# Patient Record
Sex: MALE | Race: WHITE | NOT HISPANIC OR LATINO | Employment: OTHER | URBAN - METROPOLITAN AREA
[De-identification: names, ages, dates, MRNs, and addresses within clinical notes are randomized per-mention and may not be internally consistent; named-entity substitution may affect disease eponyms.]

---

## 2023-02-03 PROBLEM — C18.0 ADENOCARCINOMA OF CECUM (MULTI): Status: ACTIVE | Noted: 2023-02-03

## 2023-02-03 PROBLEM — D69.6: Status: ACTIVE | Noted: 2023-02-03

## 2023-02-03 PROBLEM — M19.91 PRIMARY OSTEOARTHRITIS: Status: ACTIVE | Noted: 2023-02-03

## 2023-02-03 PROBLEM — I77.89 ENLARGED THORACIC AORTA (CMS-HCC): Status: ACTIVE | Noted: 2023-02-03

## 2023-02-03 PROBLEM — M51.37 DEGENERATIVE DISC DISEASE AT L5-S1 LEVEL: Status: ACTIVE | Noted: 2023-02-03

## 2023-02-03 PROBLEM — C18.9 COLON CANCER (MULTI): Status: ACTIVE | Noted: 2023-02-03

## 2023-02-03 PROBLEM — H81.01 COCHLEAR HYDROPS, RIGHT: Status: ACTIVE | Noted: 2023-02-03

## 2023-02-03 PROBLEM — M25.462 EFFUSION OF LEFT KNEE: Status: ACTIVE | Noted: 2023-02-03

## 2023-02-03 PROBLEM — I25.10 ARTERIOSCLEROSIS OF CORONARY ARTERY: Status: ACTIVE | Noted: 2023-02-03

## 2023-02-03 PROBLEM — E78.00 HYPERCHOLESTEREMIA: Status: ACTIVE | Noted: 2023-02-03

## 2023-02-03 PROBLEM — M54.9 BACK PAIN: Status: ACTIVE | Noted: 2023-02-03

## 2023-02-03 PROBLEM — M62.9 HAMSTRING TIGHTNESS OF BOTH LOWER EXTREMITIES: Status: ACTIVE | Noted: 2023-02-03

## 2023-02-03 PROBLEM — M53.3 SACRAL BACK PAIN: Status: ACTIVE | Noted: 2023-02-03

## 2023-02-03 PROBLEM — M62.89 PSOAS SYNDROME: Status: ACTIVE | Noted: 2023-02-03

## 2023-02-03 PROBLEM — G44.229 CHRONIC TENSION HEADACHES: Status: ACTIVE | Noted: 2023-02-03

## 2023-02-03 PROBLEM — I77.810 MILD ASCENDING AORTA DILATION (CMS-HCC): Status: ACTIVE | Noted: 2023-02-03

## 2023-02-03 PROBLEM — M51.379 DEGENERATIVE DISC DISEASE AT L5-S1 LEVEL: Status: ACTIVE | Noted: 2023-02-03

## 2023-02-03 PROBLEM — H91.93 BILATERAL HEARING LOSS: Status: ACTIVE | Noted: 2023-02-03

## 2023-02-03 PROBLEM — M25.569 KNEE PAIN: Status: ACTIVE | Noted: 2023-02-03

## 2023-02-03 PROBLEM — R93.1 AGATSTON CAC SCORE, >400: Status: ACTIVE | Noted: 2023-02-03

## 2023-02-03 PROBLEM — R73.9 HYPERGLYCEMIA: Status: ACTIVE | Noted: 2023-02-03

## 2023-02-03 PROBLEM — M99.09 SEGMENTAL AND SOMATIC DYSFUNCTION: Status: ACTIVE | Noted: 2023-02-03

## 2023-02-03 PROBLEM — I10 HYPERTENSION: Status: ACTIVE | Noted: 2023-02-03

## 2023-02-03 PROBLEM — H90.3 SENSORINEURAL HEARING LOSS (SNHL) OF BOTH EARS: Status: ACTIVE | Noted: 2023-02-03

## 2023-02-03 RX ORDER — MV-MIN/FOLIC/K1/LYCOPEN/LUTEIN 300-60 MCG
1 TABLET ORAL DAILY
COMMUNITY
Start: 2013-12-18

## 2023-02-03 RX ORDER — AMLODIPINE BESYLATE 10 MG/1
1 TABLET ORAL DAILY
COMMUNITY
Start: 2021-01-07 | End: 2024-05-01 | Stop reason: SDUPTHER

## 2023-02-03 RX ORDER — PREDNISONE 10 MG/1
40 TABLET ORAL DAILY
COMMUNITY
End: 2023-03-17 | Stop reason: ALTCHOICE

## 2023-02-03 RX ORDER — BUTALBITAL, ACETAMINOPHEN AND CAFFEINE 50; 325; 40 MG/1; MG/1; MG/1
1 TABLET ORAL EVERY 8 HOURS
COMMUNITY
Start: 2019-03-19

## 2023-02-03 RX ORDER — LISINOPRIL 40 MG/1
1 TABLET ORAL DAILY
COMMUNITY
Start: 2020-09-03 | End: 2023-08-31 | Stop reason: SDUPTHER

## 2023-02-03 RX ORDER — HYDROGEN PEROXIDE 3 %
20 SOLUTION, NON-ORAL MISCELLANEOUS DAILY
COMMUNITY
End: 2023-03-17 | Stop reason: ALTCHOICE

## 2023-02-03 RX ORDER — ASPIRIN 81 MG/1
1 TABLET ORAL DAILY
COMMUNITY
Start: 2015-01-21

## 2023-02-03 RX ORDER — ATORVASTATIN CALCIUM 20 MG/1
1 TABLET, FILM COATED ORAL NIGHTLY
COMMUNITY
Start: 2019-09-26

## 2023-02-03 RX ORDER — NICOTINE POLACRILEX 2 MG
1 GUM BUCCAL DAILY
COMMUNITY

## 2023-03-09 DIAGNOSIS — E78.00 HYPERCHOLESTEREMIA: ICD-10-CM

## 2023-03-09 DIAGNOSIS — N40.0 BENIGN PROSTATIC HYPERPLASIA WITHOUT LOWER URINARY TRACT SYMPTOMS: ICD-10-CM

## 2023-03-09 DIAGNOSIS — I10 PRIMARY HYPERTENSION: Primary | ICD-10-CM

## 2023-03-09 DIAGNOSIS — R73.9 HYPERGLYCEMIA: ICD-10-CM

## 2023-03-10 ENCOUNTER — LAB (OUTPATIENT)
Dept: LAB | Facility: LAB | Age: 78
End: 2023-03-10
Payer: MEDICARE

## 2023-03-10 DIAGNOSIS — N40.0 BENIGN PROSTATIC HYPERPLASIA WITHOUT LOWER URINARY TRACT SYMPTOMS: ICD-10-CM

## 2023-03-10 DIAGNOSIS — R73.9 HYPERGLYCEMIA: ICD-10-CM

## 2023-03-10 DIAGNOSIS — I10 PRIMARY HYPERTENSION: ICD-10-CM

## 2023-03-10 LAB
ALANINE AMINOTRANSFERASE (SGPT) (U/L) IN SER/PLAS: 21 U/L (ref 10–52)
ALBUMIN (G/DL) IN SER/PLAS: 4.2 G/DL (ref 3.4–5)
ALKALINE PHOSPHATASE (U/L) IN SER/PLAS: 66 U/L (ref 33–136)
ANION GAP IN SER/PLAS: 11 MMOL/L (ref 10–20)
ASPARTATE AMINOTRANSFERASE (SGOT) (U/L) IN SER/PLAS: 28 U/L (ref 9–39)
BASOPHILS (10*3/UL) IN BLOOD BY AUTOMATED COUNT: 0.02 X10E9/L (ref 0–0.1)
BASOPHILS/100 LEUKOCYTES IN BLOOD BY AUTOMATED COUNT: 0.4 % (ref 0–2)
BILIRUBIN TOTAL (MG/DL) IN SER/PLAS: 0.8 MG/DL (ref 0–1.2)
CALCIUM (MG/DL) IN SER/PLAS: 9.2 MG/DL (ref 8.6–10.3)
CARBON DIOXIDE, TOTAL (MMOL/L) IN SER/PLAS: 27 MMOL/L (ref 21–32)
CHLORIDE (MMOL/L) IN SER/PLAS: 99 MMOL/L (ref 98–107)
CHOLESTEROL (MG/DL) IN SER/PLAS: 150 MG/DL (ref 0–199)
CHOLESTEROL IN HDL (MG/DL) IN SER/PLAS: 77.3 MG/DL
CHOLESTEROL/HDL RATIO: 1.9
CREATININE (MG/DL) IN SER/PLAS: 0.94 MG/DL (ref 0.5–1.3)
EOSINOPHILS (10*3/UL) IN BLOOD BY AUTOMATED COUNT: 0.16 X10E9/L (ref 0–0.4)
EOSINOPHILS/100 LEUKOCYTES IN BLOOD BY AUTOMATED COUNT: 3.2 % (ref 0–6)
ERYTHROCYTE DISTRIBUTION WIDTH (RATIO) BY AUTOMATED COUNT: 11.5 % (ref 11.5–14.5)
ERYTHROCYTE MEAN CORPUSCULAR HEMOGLOBIN CONCENTRATION (G/DL) BY AUTOMATED: 32.3 G/DL (ref 32–36)
ERYTHROCYTE MEAN CORPUSCULAR VOLUME (FL) BY AUTOMATED COUNT: 102 FL (ref 80–100)
ERYTHROCYTES (10*6/UL) IN BLOOD BY AUTOMATED COUNT: 4.58 X10E12/L (ref 4.5–5.9)
ESTIMATED AVERAGE GLUCOSE FOR HBA1C: 94 MG/DL
GFR MALE: 83 ML/MIN/1.73M2
GLUCOSE (MG/DL) IN SER/PLAS: 111 MG/DL (ref 74–99)
HEMATOCRIT (%) IN BLOOD BY AUTOMATED COUNT: 46.5 % (ref 41–52)
HEMOGLOBIN (G/DL) IN BLOOD: 15 G/DL (ref 13.5–17.5)
HEMOGLOBIN A1C/HEMOGLOBIN TOTAL IN BLOOD: 4.9 %
IMMATURE GRANULOCYTES/100 LEUKOCYTES IN BLOOD BY AUTOMATED COUNT: 0.4 % (ref 0–0.9)
LDL: 63 MG/DL (ref 0–99)
LEUKOCYTES (10*3/UL) IN BLOOD BY AUTOMATED COUNT: 5 X10E9/L (ref 4.4–11.3)
LYMPHOCYTES (10*3/UL) IN BLOOD BY AUTOMATED COUNT: 2.42 X10E9/L (ref 0.8–3)
LYMPHOCYTES/100 LEUKOCYTES IN BLOOD BY AUTOMATED COUNT: 48.4 % (ref 13–44)
MONOCYTES (10*3/UL) IN BLOOD BY AUTOMATED COUNT: 0.48 X10E9/L (ref 0.05–0.8)
MONOCYTES/100 LEUKOCYTES IN BLOOD BY AUTOMATED COUNT: 9.6 % (ref 2–10)
NEUTROPHILS (10*3/UL) IN BLOOD BY AUTOMATED COUNT: 1.9 X10E9/L (ref 1.6–5.5)
NEUTROPHILS/100 LEUKOCYTES IN BLOOD BY AUTOMATED COUNT: 38 % (ref 40–80)
PLATELETS (10*3/UL) IN BLOOD AUTOMATED COUNT: 273 X10E9/L (ref 150–450)
POTASSIUM (MMOL/L) IN SER/PLAS: 4.8 MMOL/L (ref 3.5–5.3)
PROSTATE SPECIFIC AG (NG/ML) IN SER/PLAS: 0.21 NG/ML (ref 0–4)
PROTEIN TOTAL: 6.8 G/DL (ref 6.4–8.2)
SODIUM (MMOL/L) IN SER/PLAS: 132 MMOL/L (ref 136–145)
TRIGLYCERIDE (MG/DL) IN SER/PLAS: 49 MG/DL (ref 0–149)
UREA NITROGEN (MG/DL) IN SER/PLAS: 13 MG/DL (ref 6–23)
VLDL: 10 MG/DL (ref 0–40)

## 2023-03-10 PROCEDURE — 84153 ASSAY OF PSA TOTAL: CPT

## 2023-03-10 PROCEDURE — 80061 LIPID PANEL: CPT

## 2023-03-10 PROCEDURE — 85025 COMPLETE CBC W/AUTO DIFF WBC: CPT

## 2023-03-10 PROCEDURE — 36415 COLL VENOUS BLD VENIPUNCTURE: CPT

## 2023-03-10 PROCEDURE — 83036 HEMOGLOBIN GLYCOSYLATED A1C: CPT

## 2023-03-10 PROCEDURE — 80053 COMPREHEN METABOLIC PANEL: CPT

## 2023-03-17 ENCOUNTER — OFFICE VISIT (OUTPATIENT)
Dept: PRIMARY CARE | Facility: CLINIC | Age: 78
End: 2023-03-17
Payer: MEDICARE

## 2023-03-17 VITALS
HEIGHT: 69 IN | WEIGHT: 172 LBS | OXYGEN SATURATION: 97 % | TEMPERATURE: 98.1 F | BODY MASS INDEX: 25.48 KG/M2 | SYSTOLIC BLOOD PRESSURE: 138 MMHG | HEART RATE: 71 BPM | RESPIRATION RATE: 16 BRPM | DIASTOLIC BLOOD PRESSURE: 70 MMHG

## 2023-03-17 DIAGNOSIS — I10 PRIMARY HYPERTENSION: ICD-10-CM

## 2023-03-17 DIAGNOSIS — I77.810 MILD ASCENDING AORTA DILATION (CMS-HCC): ICD-10-CM

## 2023-03-17 DIAGNOSIS — G44.221 CHRONIC TENSION-TYPE HEADACHE, INTRACTABLE: Primary | ICD-10-CM

## 2023-03-17 DIAGNOSIS — C18.0 ADENOCARCINOMA OF CECUM (MULTI): ICD-10-CM

## 2023-03-17 DIAGNOSIS — M17.12 PRIMARY OSTEOARTHRITIS OF LEFT KNEE: ICD-10-CM

## 2023-03-17 PROBLEM — C18.9 COLON CANCER (MULTI): Status: RESOLVED | Noted: 2023-02-03 | Resolved: 2023-03-17

## 2023-03-17 PROBLEM — D69.6: Status: RESOLVED | Noted: 2023-02-03 | Resolved: 2023-03-17

## 2023-03-17 PROCEDURE — 1160F RVW MEDS BY RX/DR IN RCRD: CPT | Performed by: INTERNAL MEDICINE

## 2023-03-17 PROCEDURE — 1036F TOBACCO NON-USER: CPT | Performed by: INTERNAL MEDICINE

## 2023-03-17 PROCEDURE — G0439 PPPS, SUBSEQ VISIT: HCPCS | Performed by: INTERNAL MEDICINE

## 2023-03-17 PROCEDURE — 3075F SYST BP GE 130 - 139MM HG: CPT | Performed by: INTERNAL MEDICINE

## 2023-03-17 PROCEDURE — 1159F MED LIST DOCD IN RCRD: CPT | Performed by: INTERNAL MEDICINE

## 2023-03-17 PROCEDURE — 3078F DIAST BP <80 MM HG: CPT | Performed by: INTERNAL MEDICINE

## 2023-03-17 PROCEDURE — 1170F FXNL STATUS ASSESSED: CPT | Performed by: INTERNAL MEDICINE

## 2023-03-17 ASSESSMENT — ENCOUNTER SYMPTOMS
FREQUENCY: 0
SLEEP DISTURBANCE: 0
JOINT SWELLING: 0

## 2023-03-17 ASSESSMENT — ACTIVITIES OF DAILY LIVING (ADL)
GROCERY_SHOPPING: INDEPENDENT
DOING_HOUSEWORK: INDEPENDENT
DRESSING: INDEPENDENT
BATHING: INDEPENDENT
TAKING_MEDICATION: INDEPENDENT
MANAGING_FINANCES: INDEPENDENT

## 2023-03-17 ASSESSMENT — PATIENT HEALTH QUESTIONNAIRE - PHQ9
2. FEELING DOWN, DEPRESSED OR HOPELESS: NOT AT ALL
SUM OF ALL RESPONSES TO PHQ9 QUESTIONS 1 AND 2: 0
1. LITTLE INTEREST OR PLEASURE IN DOING THINGS: NOT AT ALL

## 2023-03-17 NOTE — PROGRESS NOTES
Patient here for a Medicare wellness visit and follow up    Subjective   Patient ID: Jose Gamez is a 77 y.o. male who presents for Follow-up and Medicare Annual Wellness Visit Subsequent.    The patient reports that the back stiffness has significantly improved since completing physical therapy sessions and establishing care with  from Orthopedic Surgery.  He was treated for left knee pain and swelling with aspiration and corticosteroid injection which has helped significantly.  He remains relatively physically active with regular exercise.      The patient continues to follow with  from Cardiology and reports that his condition is stable.      The patient completed his colonoscopy in 12/9/2022 with  from Gastroenterology.  He is no longer taking esomeprazole 20mg every day.    The patient wears a hearing aid device which are somewhat helpful.  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  He reports somewhat good sleep.  The patient drinks plenty of water and denies significant urinary symptoms.          Review of Systems   HENT:  Positive for hearing loss.    Genitourinary:  Negative for frequency.   Musculoskeletal:  Negative for gait problem and joint swelling.   Psychiatric/Behavioral:  Negative for sleep disturbance.        Objective   Physical Exam  Constitutional:       Appearance: Normal appearance.   Cardiovascular:      Rate and Rhythm: Normal rate and regular rhythm.      Heart sounds: Normal heart sounds.   Pulmonary:      Effort: Pulmonary effort is normal.      Breath sounds: Normal breath sounds.   Abdominal:      General: Bowel sounds are normal.      Palpations: Abdomen is soft.      Tenderness: There is no abdominal tenderness.   Skin:     General: Skin is warm and dry.   Neurological:      General: No focal deficit present.      Mental Status: He is alert and oriented to person, place, and time. Mental status is at  baseline.   Psychiatric:         Mood and Affect: Mood normal.         Behavior: Behavior normal.         Assessment/Plan       Medicare Wellness Examination Done  -  Discussed healthy diet and regular exercise.    -  Physical exam overall unremarkable. Immunizations reviewed and updated accordingly. Healthy lifestyle choices discussed (tobacco avoidance, appropriate alcohol use, avoidance of illicit substances).   -  Patient is wearing seatbelt.   -  Screening lab work ordered as indicated.    -  Age appropriate screening tests reviewed with patient.       IMPRESSION/PLAN:     HTN   - /70 today in office, continue on lisinopril 40mg QD and amlodipine 10mg QD.      HLD   - Stable, continue on atorvastatin 20mg QD.     CAD   - Following with Dr. Contreras in Cardiology, takes ASA 81mg QD. Echocardiogram performed 8/2021 showed impaired relaxation patter of left ventricular diastolic filling and left atrium with mild-moderate dilation. Repeat in 8/2022 stable.     Elevated Glucose   - Last a1c 4.9% - 3/2023. Glucose elevated at 111 per 3/2023 CMP, Discussed portion control. Advised foods high in healthy protein (chicken, turkey, salmon) and avoidance of sodium and complex carbs. Encouraged cardio exercise 30 minutes daily.      Tension Headaches   - Takes Fioricet -40mg as needed for onset of headache.     Left Knee Pain/Swelling  - Follows with  from Orthopedic Surgery.    Back stiffness  - Follows up with  from [facility].     History of colon cancer  - has appointment set up w/ Dr. Thayer     Health Maintenance   - Routine labs 3/2023. Last PSA wnl 3/2023.  Last colonoscopy 12/2022, repeat due 12/2025 due to poor prep.  Advised patient to receive his TDAP booster through the Pharmacy and discussed adverse effects.     Follow-up in 6 months, call sooner if needed.       Scribe Attestation  By signing my name below, I, Renetta Pettit , Scribe   attest that this documentation has been prepared  under the direction and in the presence of Perry Samaniego DO.

## 2023-08-31 DIAGNOSIS — I10 PRIMARY HYPERTENSION: Primary | ICD-10-CM

## 2023-08-31 RX ORDER — LISINOPRIL 40 MG/1
40 TABLET ORAL DAILY
Qty: 90 TABLET | Refills: 3 | Status: SHIPPED | OUTPATIENT
Start: 2023-08-31

## 2023-09-18 ENCOUNTER — OFFICE VISIT (OUTPATIENT)
Dept: PRIMARY CARE | Facility: CLINIC | Age: 78
End: 2023-09-18
Payer: MEDICARE

## 2023-09-18 VITALS
WEIGHT: 173 LBS | OXYGEN SATURATION: 98 % | HEART RATE: 79 BPM | SYSTOLIC BLOOD PRESSURE: 128 MMHG | TEMPERATURE: 97.9 F | DIASTOLIC BLOOD PRESSURE: 70 MMHG | BODY MASS INDEX: 25.55 KG/M2 | RESPIRATION RATE: 16 BRPM

## 2023-09-18 DIAGNOSIS — R73.9 HYPERGLYCEMIA: ICD-10-CM

## 2023-09-18 DIAGNOSIS — I10 PRIMARY HYPERTENSION: ICD-10-CM

## 2023-09-18 DIAGNOSIS — Z23 ENCOUNTER FOR IMMUNIZATION: Primary | ICD-10-CM

## 2023-09-18 DIAGNOSIS — I25.10 ARTERIOSCLEROSIS OF CORONARY ARTERY: ICD-10-CM

## 2023-09-18 DIAGNOSIS — Z12.5 PROSTATE CANCER SCREENING: ICD-10-CM

## 2023-09-18 DIAGNOSIS — H91.93 BILATERAL HEARING LOSS, UNSPECIFIED HEARING LOSS TYPE: ICD-10-CM

## 2023-09-18 PROCEDURE — G0008 ADMIN INFLUENZA VIRUS VAC: HCPCS | Performed by: INTERNAL MEDICINE

## 2023-09-18 PROCEDURE — 3074F SYST BP LT 130 MM HG: CPT | Performed by: INTERNAL MEDICINE

## 2023-09-18 PROCEDURE — 1160F RVW MEDS BY RX/DR IN RCRD: CPT | Performed by: INTERNAL MEDICINE

## 2023-09-18 PROCEDURE — 1126F AMNT PAIN NOTED NONE PRSNT: CPT | Performed by: INTERNAL MEDICINE

## 2023-09-18 PROCEDURE — 3078F DIAST BP <80 MM HG: CPT | Performed by: INTERNAL MEDICINE

## 2023-09-18 PROCEDURE — 1159F MED LIST DOCD IN RCRD: CPT | Performed by: INTERNAL MEDICINE

## 2023-09-18 PROCEDURE — 1036F TOBACCO NON-USER: CPT | Performed by: INTERNAL MEDICINE

## 2023-09-18 PROCEDURE — 99214 OFFICE O/P EST MOD 30 MIN: CPT | Performed by: INTERNAL MEDICINE

## 2023-09-18 PROCEDURE — 90662 IIV NO PRSV INCREASED AG IM: CPT | Performed by: INTERNAL MEDICINE

## 2023-09-18 ASSESSMENT — ENCOUNTER SYMPTOMS
DIARRHEA: 0
CONSTIPATION: 0
FREQUENCY: 0
BLOOD IN STOOL: 0

## 2023-09-18 NOTE — PROGRESS NOTES
Patient here for a follow up     Subjective   Patient ID: Jose Gamez is a 78 y.o. male who presents for Follow-up.  He is generally doing well today.    The patient reports knee pain and recalls undergoing paracentesis in 2022.  The symptoms are tolerable to date, and he follows with  from Orthopedic Surgery.    The patient followed up with  from Cardiology, and reports that his condition is stable.  He maintains an active lifestyle, and denies any chest pain or lower limb edema.    The patient reports ongoing hearing impairment and notes that his hearing aid device is partially effective.  He intends to undergo Audiometry a second time.    The patient denies any hematochezia, melena, bowel problems, or significant urinary symptoms.      Review of Systems   HENT:  Positive for hearing loss.    Cardiovascular:  Negative for chest pain and leg swelling.   Gastrointestinal:  Negative for blood in stool, constipation and diarrhea.   Genitourinary:  Negative for frequency.   Musculoskeletal:         Positive for knee pain.       Objective   Physical Exam  Constitutional:       Appearance: Normal appearance.   Neck:      Vascular: No carotid bruit.   Cardiovascular:      Rate and Rhythm: Normal rate and regular rhythm.      Heart sounds: Normal heart sounds.   Pulmonary:      Effort: Pulmonary effort is normal.      Breath sounds: Normal breath sounds.   Abdominal:      General: Bowel sounds are normal.      Palpations: Abdomen is soft.      Tenderness: There is no abdominal tenderness.   Skin:     General: Skin is warm and dry.   Neurological:      General: No focal deficit present.      Mental Status: He is alert and oriented to person, place, and time. Mental status is at baseline.   Psychiatric:         Mood and Affect: Mood normal.         Behavior: Behavior normal.       Assessment/Plan       IMPRESSION/PLAN:     HTN   - /70 today in office, continue on lisinopril 40mg QD and  amlodipine 10mg QD.      HLD   - Stable, continue on atorvastatin 20mg QD.     CAD   - Following with Dr. Contreras in Cardiology, takes ASA 81mg QD. Echocardiogram performed 8/2021 showed impaired relaxation patter of left ventricular diastolic filling and left atrium with mild-moderate dilation. Repeat in 8/2022 stable.     Elevated Glucose   - Last a1c 4.9% - 3/2023. Glucose elevated at 111 per 3/2023 CMP, Discussed portion control. Advised foods high in healthy protein (chicken, turkey, salmon) and avoidance of sodium and complex carbs. Encouraged cardio exercise 30 minutes daily.      Tension Headaches   - Takes Fioricet -40mg as needed for onset of headache.     Left Knee Pain/Swelling  - Follows with  from Orthopedic Surgery.     Back stiffness  - Follows up with  from [facility].     History of colon cancer  - has appointment set up w/ Dr. Thayer     [facility]   - Routine labs 3/2023, will repeat prior to next visit. Last PSA wnl 3/2023.  Last colonoscopy 12/2022, repeat due 12/2025 due to poor prep.  Advised patient to receive the new Covid-19 booster when it is available as well as the RSV vaccine separately through the pharmacy after 1 month, and discussed adverse effects.     Follow-up in 6 months, call sooner if needed.       Scribe Attestation  By signing my name below, IRenetta, Scrcris   attest that this documentation has been prepared under the direction and in the presence of Perry Samaniego DO.

## 2023-11-13 ENCOUNTER — CLINICAL SUPPORT (OUTPATIENT)
Dept: AUDIOLOGY | Facility: CLINIC | Age: 78
End: 2023-11-13
Payer: MEDICARE

## 2023-11-13 DIAGNOSIS — H90.3 SENSORINEURAL HEARING LOSS (SNHL) OF BOTH EARS: Primary | ICD-10-CM

## 2023-11-13 PROCEDURE — 92557 COMPREHENSIVE HEARING TEST: CPT | Performed by: AUDIOLOGIST

## 2023-11-13 PROCEDURE — 92550 TYMPANOMETRY & REFLEX THRESH: CPT | Performed by: AUDIOLOGIST

## 2023-11-13 NOTE — PROGRESS NOTES
Name: Jose Gamez  YOB: 1945  Age: 78 y.o.    Date of Evaluation:  11/13/2023   Jose, 78 years old, was seen for an annual hearing test and hearing aid check. He sees Dr. Sadie Argueta MD. for decreased hearing and a clogged feeling in the right ear. He denied dizziness. Most recent hearing test from January 6, 2023 indicated a a mild rising to within normal limits at 1000 Hz sloping to severe sensorineural hearing loss in the right ear and hearing sensitivity within normal limits through 2000 Hz sloping to a moderately-severe sensorineural hearing loss in the left ear. An asymmetry of 10 - 25 dB HL was noted from 500 - 3000 Hz and at 8000 Hz (right poorer than left). He wears binaural Phonak AGATA hearing aids. He reports his right ear to be worse compared to his last hearing test.    Phonak Audeo P50-R  Right: [id number]  Left: [id number]    Warranty expires 9/14/2025     Evaluation:  Otoscopy revealed clear canals with visible tympanic membranes bilaterally.    Immittance:  Right: Type A middle ear function with normal compliance, peak pressure, and ear canal volume.  Left: Type A middle ear function with normal compliance, peak pressure, and ear canal volume.    Ipsilateral Acoustic Reflexes:  Right: Present 500-2000 Hz. Absent 4000 Hz.  Left: Present at 0488-5679 Hz. Absent 500 and 4000 Hz.    Behavioral Audiometry:  Right: moderate rising to mild sloping to moderate sensorineural hearing loss 125-8000 Hz. Fair to poor word understanding (40 %) at 70 dB HL.  Left:  normal hearing sloping to moderate sensorineural hearing loss 125-8000 Hz. Excellent word understanding (96 %) at 60 dB HL.    Pure tone averages in agreement with speech reception thresholds.    Results:  Today's results were discussed with the patient indicating a decrease in hearing in his right ear. Hearing aids were reprogrammed to today's results.    Mr. Gamez would like a second opinion regarding his right  cochlear hydrops diagnosis. He was given the  Otology scheduling line to schedule when interested.    Treatment Plan:  Follow-up with rENT  Retest hearing in conjunction with medical management or if a change in hearing occurs  Daily use binaural amplification    Time: 2962-8806    DAVID Moyer, CCC-A  Licensed Audiologist

## 2023-11-30 ENCOUNTER — TELEMEDICINE (OUTPATIENT)
Dept: PRIMARY CARE | Facility: CLINIC | Age: 78
End: 2023-11-30
Payer: MEDICARE

## 2023-11-30 DIAGNOSIS — U07.1 COVID-19: Primary | ICD-10-CM

## 2023-11-30 PROCEDURE — 99213 OFFICE O/P EST LOW 20 MIN: CPT | Performed by: INTERNAL MEDICINE

## 2023-11-30 RX ORDER — AZITHROMYCIN 250 MG/1
TABLET, FILM COATED ORAL DAILY
COMMUNITY
End: 2024-02-13 | Stop reason: ALTCHOICE

## 2023-11-30 ASSESSMENT — ENCOUNTER SYMPTOMS
EYE REDNESS: 1
COUGH: 1

## 2023-11-30 NOTE — PROGRESS NOTES
Patient doing a virtual visit for positive COVID.  Virtual or Telephone Consent    An interactive audio and video telecommunication system which permits real time communications between the patient (at the originating site) and provider (at the distant site) was utilized to provide this telehealth service.   Verbal consent was requested and obtained from Jose Gamez on this date, 11/30/23 for a telehealth visit.       Subjective   Patient ID: Jose Gamez is a 78 y.o. male who presents for URI.    The patient reports right-sided eye redness, bilateral ear fullness, chest congestion, productive cough with clear sputum since about 11/24/2023.  He presented to Urgent Care twice since that time, and was started on Zithromax which has been helping along with Delsym cough syrup.  The patient recalls that a Chest Xray was unremarkable.  More recently, he and his wife both tested positive for Covid-19.  Overall, his condition is improving.    URI   Associated symptoms include coughing.     Review of Systems   HENT:          Positive for bilateral ear fullness.   Eyes:  Positive for redness.   Respiratory:  Positive for cough.        Objective   Physical Exam  Physical examination not done.    Assessment/Plan   Problem List Items Addressed This Visit    None      IMPRESSION/PLAN:     Covid-19  - Patient is outside of window for Paxlovid therapy.  Condition is improving.  Continue with Zithromax until finished, and advised to start Mucinex 1200 mg BID over the counter.  Can also try Flonase as two sprays in each nostril for symptom relief, and can add on Afrin for up to 2-3 days as described on the packaging.  Do not go longer than this timeframe with Afrin.  Advised patient to quarantine for first five days from onset of symptoms, and then wear a mask for five days thereafter.  Rest as much as possible, and drink plenty of water to remain well hydrated. Call the clinic if symptoms persist or worsen.      HTN   - Continue on lisinopril 40mg QD and amlodipine 10mg QD.      HLD   - Stable, continue on atorvastatin 20mg QD.     CAD   - Following with Dr. Contreras in Cardiology, takes ASA 81mg QD. Echocardiogram performed 8/2021 showed impaired relaxation patter of left ventricular diastolic filling and left atrium with mild-moderate dilation. Repeat in 8/2022 stable.     Elevated Glucose   - Last a1c 4.9% - 3/2023. Glucose elevated at 111 per 3/2023 CMP, Discussed portion control. Advised foods high in healthy protein (chicken, turkey, salmon) and avoidance of sodium and complex carbs. Encouraged cardio exercise 30 minutes daily.      Tension Headaches   - Takes Fioricet -40mg as needed for onset of headache.     Left Knee Pain/Swelling  - Follows with  from Orthopedic Surgery.     Back stiffness  - Follows up with  from [facility].     History of colon cancer  - has appointment set up w/ Dr. Thayer     [facility]   - Routine labs 3/2023, will repeat prior to next visit. Last PSA wnl 3/2023.  Last colonoscopy 12/2022, repeat due 12/2025 due to poor prep.       Follow-up in 6 months, call sooner if needed.       Scribe Attestation  By signing my name below, I, Renetta Pettit, Darrion   attest that this documentation has been prepared under the direction and in the presence of Perry Samaniego DO.

## 2023-12-13 ENCOUNTER — OFFICE VISIT (OUTPATIENT)
Dept: PRIMARY CARE | Facility: CLINIC | Age: 78
End: 2023-12-13
Payer: MEDICARE

## 2023-12-13 VITALS
DIASTOLIC BLOOD PRESSURE: 64 MMHG | BODY MASS INDEX: 25.42 KG/M2 | HEART RATE: 60 BPM | SYSTOLIC BLOOD PRESSURE: 115 MMHG | HEIGHT: 69 IN | TEMPERATURE: 97.3 F | OXYGEN SATURATION: 98 % | WEIGHT: 171.6 LBS

## 2023-12-13 DIAGNOSIS — H72.92 PERFORATION OF LEFT TYMPANIC MEMBRANE: Primary | ICD-10-CM

## 2023-12-13 PROBLEM — B00.9 HERPESVIRAL INFECTION, UNSPECIFIED: Status: ACTIVE | Noted: 2018-03-23

## 2023-12-13 PROBLEM — H91.23 SUDDEN HEARING LOSS OF BOTH EARS: Status: ACTIVE | Noted: 2023-12-01

## 2023-12-13 PROBLEM — L90.5 SCAR CONDITION AND FIBROSIS OF SKIN: Status: ACTIVE | Noted: 2018-03-23

## 2023-12-13 PROBLEM — C44.311 BASAL CELL CARCINOMA OF SKIN OF NOSE: Status: ACTIVE | Noted: 2018-03-23

## 2023-12-13 PROBLEM — L71.9 ROSACEA, UNSPECIFIED: Status: ACTIVE | Noted: 2018-03-23

## 2023-12-13 PROCEDURE — 1159F MED LIST DOCD IN RCRD: CPT | Performed by: NURSE PRACTITIONER

## 2023-12-13 PROCEDURE — 1036F TOBACCO NON-USER: CPT | Performed by: NURSE PRACTITIONER

## 2023-12-13 PROCEDURE — 3078F DIAST BP <80 MM HG: CPT | Performed by: NURSE PRACTITIONER

## 2023-12-13 PROCEDURE — 1126F AMNT PAIN NOTED NONE PRSNT: CPT | Performed by: NURSE PRACTITIONER

## 2023-12-13 PROCEDURE — 1160F RVW MEDS BY RX/DR IN RCRD: CPT | Performed by: NURSE PRACTITIONER

## 2023-12-13 PROCEDURE — 3074F SYST BP LT 130 MM HG: CPT | Performed by: NURSE PRACTITIONER

## 2023-12-13 PROCEDURE — 99213 OFFICE O/P EST LOW 20 MIN: CPT | Performed by: NURSE PRACTITIONER

## 2023-12-13 ASSESSMENT — PATIENT HEALTH QUESTIONNAIRE - PHQ9
2. FEELING DOWN, DEPRESSED OR HOPELESS: NOT AT ALL
1. LITTLE INTEREST OR PLEASURE IN DOING THINGS: NOT AT ALL
SUM OF ALL RESPONSES TO PHQ9 QUESTIONS 1 AND 2: 0

## 2023-12-13 NOTE — PROGRESS NOTES
"Subjective   Patient ID: Jose Gamez is a 78 y.o. male who presents for blocked ear.    Presents for  follow up for left ear pressure and acute loss of hearing.  Has been having chronic hearing loss for some time; considering cochlear implants. Left ear though is usually his good ear. Now hard to hear overall.  He was given amoxicillin 1,000 mg TID x 5 days and otic drops.  Started these yesterday.  Denies any drainage.  Started after coughing a lot and URI symptoms from COVID end of November.          Review of Systems  otherwise negative aside from what was mentioned above in HPI.    Objective   /64   Pulse 60   Temp 36.3 °C (97.3 °F)   Ht 1.753 m (5' 9\")   Wt 77.8 kg (171 lb 9.6 oz)   SpO2 98%   BMI 25.34 kg/m²     Physical Exam  Constitutional:       Appearance: Normal appearance.   HENT:      Right Ear: Tympanic membrane, ear canal and external ear normal.      Left Ear: Ear canal and external ear normal. A middle ear effusion is present. Tympanic membrane is perforated.      Ears:      Comments: Perforated TM appears to be healing already; Scabbed 11oclock region  Cardiovascular:      Rate and Rhythm: Normal rate and regular rhythm.   Pulmonary:      Effort: Pulmonary effort is normal.      Breath sounds: Normal breath sounds.   Abdominal:      General: Abdomen is flat. Bowel sounds are normal.   Neurological:      General: No focal deficit present.      Mental Status: He is alert and oriented to person, place, and time. Mental status is at baseline.   Psychiatric:         Mood and Affect: Mood normal.         Behavior: Behavior normal.         Thought Content: Thought content normal.         Judgment: Judgment normal.         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 On Amoxicillin 1 g TID x 5 days and otic drops  Follow " up with ENT as scheduled. Sooner in our office if not better.

## 2023-12-13 NOTE — ASSESSMENT & PLAN NOTE
Acute on chronic hearing loss from infection. On Amoxicillin 1 g TID x 5 days and otic drops  Follow up with ENT as scheduled. Sooner in our office if not better.

## 2024-01-18 ENCOUNTER — CLINICAL SUPPORT (OUTPATIENT)
Dept: AUDIOLOGY | Facility: CLINIC | Age: 79
End: 2024-01-18
Payer: MEDICARE

## 2024-01-18 ENCOUNTER — OFFICE VISIT (OUTPATIENT)
Dept: OTOLARYNGOLOGY | Facility: CLINIC | Age: 79
End: 2024-01-18
Payer: MEDICARE

## 2024-01-18 VITALS — HEIGHT: 70 IN | WEIGHT: 173 LBS | TEMPERATURE: 97.7 F | BODY MASS INDEX: 24.77 KG/M2 | RESPIRATION RATE: 16 BRPM

## 2024-01-18 DIAGNOSIS — H93.13 TINNITUS, BILATERAL: ICD-10-CM

## 2024-01-18 DIAGNOSIS — H81.01 COCHLEAR HYDROPS, RIGHT: ICD-10-CM

## 2024-01-18 DIAGNOSIS — H90.3 SENSORINEURAL HEARING LOSS (SNHL) OF BOTH EARS: Primary | ICD-10-CM

## 2024-01-18 DIAGNOSIS — H90.A21 SENSORINEURAL HEARING LOSS (SNHL) OF RIGHT EAR WITH RESTRICTED HEARING OF LEFT EAR: Primary | ICD-10-CM

## 2024-01-18 PROCEDURE — 99213 OFFICE O/P EST LOW 20 MIN: CPT | Performed by: STUDENT IN AN ORGANIZED HEALTH CARE EDUCATION/TRAINING PROGRAM

## 2024-01-18 PROCEDURE — 1036F TOBACCO NON-USER: CPT | Performed by: STUDENT IN AN ORGANIZED HEALTH CARE EDUCATION/TRAINING PROGRAM

## 2024-01-18 PROCEDURE — 92550 TYMPANOMETRY & REFLEX THRESH: CPT | Performed by: AUDIOLOGIST

## 2024-01-18 PROCEDURE — 1126F AMNT PAIN NOTED NONE PRSNT: CPT | Performed by: STUDENT IN AN ORGANIZED HEALTH CARE EDUCATION/TRAINING PROGRAM

## 2024-01-18 PROCEDURE — 1160F RVW MEDS BY RX/DR IN RCRD: CPT | Performed by: STUDENT IN AN ORGANIZED HEALTH CARE EDUCATION/TRAINING PROGRAM

## 2024-01-18 PROCEDURE — 1159F MED LIST DOCD IN RCRD: CPT | Performed by: STUDENT IN AN ORGANIZED HEALTH CARE EDUCATION/TRAINING PROGRAM

## 2024-01-18 PROCEDURE — 92557 COMPREHENSIVE HEARING TEST: CPT | Performed by: AUDIOLOGIST

## 2024-01-18 ASSESSMENT — ENCOUNTER SYMPTOMS
DEPRESSION: 0
OCCASIONAL FEELINGS OF UNSTEADINESS: 0
LOSS OF SENSATION IN FEET: 0

## 2024-01-18 ASSESSMENT — COLUMBIA-SUICIDE SEVERITY RATING SCALE - C-SSRS
2. HAVE YOU ACTUALLY HAD ANY THOUGHTS OF KILLING YOURSELF?: NO
6. HAVE YOU EVER DONE ANYTHING, STARTED TO DO ANYTHING, OR PREPARED TO DO ANYTHING TO END YOUR LIFE?: NO
1. IN THE PAST MONTH, HAVE YOU WISHED YOU WERE DEAD OR WISHED YOU COULD GO TO SLEEP AND NOT WAKE UP?: NO

## 2024-01-18 ASSESSMENT — PAIN SCALES - GENERAL: PAINLEVEL: 0-NO PAIN

## 2024-01-18 NOTE — PROGRESS NOTES
"AUDIOLOGY ADULT AUDIOMETRIC EVALUATION      Name:  Jose Gamez  :  1945  Age:  78 y.o.  Date of Evaluation:  2024    HISTORY  Reason for visit:  hearing loss  Mr. Gamez is seen 2024 at the request of Rajendra Uriarte M.D. for an evaluation of hearing.      Chief complaint:    Hearing issues for years    - he went snorkeling around [place]2023  - he experienced bacterial chest infection and lost left hearing   - there was concern for left tympanic membrane perforation   - hearing has improved but not back to baseline  - he has a diagnosis of right cochlear hydrops    Hearing loss:  sincve 2023, right seems stable, left is reportedly worse  Tinnitus:   no change; not bothersome  Hearing aids: Patient uses bilateral Phonak Audeo P50-R [employer] aids (SN Right: [id number]; Left: [id number])    EVALUATION  Please find audiogram in \"Media\" tab (Document Type:  Audiology Report).    RESULTS   Otoscopic Evaluation: Right, clear canal.  Left, non-occlusive debris.       Immittance Measures (226 Hz probe tone):     Right ear:  Tympanometry is consistent with normal middle ear pressure and normal tympanic membrane mobility.    Left ear: Tympanometry is consistent with normal middle ear pressure and restricted tympanic membrane mobility.   note wide, rounded left trace.     Test technique:  standard behavioral technique via insert earphones.  Reliability is good.    Pure Tone Audiometry:    Right ear:  Hearing sensitivity is in the mild to moderately-severe hearing loss range.    Left ear: Hearing sensitivity is in the normal hearing to moderately-severe hearing loss range.     Hearing loss is sensorineural   Note asymmetry for 125-500 and 9589-7063 Hz (right worse than left)     Speech Audiometry:        Right Ear:  Speech Reception Threshold (SRT) was obtained at 30 dBHL                 Speech discrimination score was 76% in quiet when words were presented at 80 dBHL      Left Ear:  " Speech Reception Threshold (SRT) was obtained at 25 dBHL                 Speech discrimination score was 84% in quiet when words were presented at 75 dBHL    IMPRESSIONS:  In comparison with previous audiogram of 1/6/2023, there has been improvement in right hearing for 125-1000 Hz as well as improvement in right speech discrimination score.  There has been worsening of left hearing for 1511-7069 Hz.      Patient is expected to have communication difficulty in adverse listening environments.    Patient is expected to benefit from devices that provide amplification (e.g., hearing aids) and improve the desired sound signal over that of background noise.      RECOMMENDATIONS  Continue with otologic follow-up with Rajendra Uriarte M.D.   Hearing Aid Evaluation with an audiologist to discuss hearing technology (such as hearing aids) and services.   Reassess hearing in 1 year (or sooner if medically indicated or if there is a concern for a change in hearing).       PATIENT EDUCATION  Discussed results and recommendations with patient.  Questions were addressed and the patient was encouraged to contact our department should concerns arise.       DAVID Hernandez, CCC-A  Licensed Audiologist

## 2024-01-19 NOTE — PROGRESS NOTES
CHIEF COMPLAINT:   Chief Complaint   Patient presents with    New Patient Visit     Hearing loss       HISTORY OF PRESENT ILLNESS: Jose Gamez is a 78 y.o. male who presents today with symptoms of left and right ear hearing loss.  He has a history of right sided fluctuating hearing loss and has been previously seen by Dr. 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  Dr. Argueta obtained an MRI IAC, which did not demonstrate retrocochlear pathology.  He presents today to discuss his left ear.  Around Thanksgiving, he had an incident with diving, and since that time, he felt that his left ear has been muffled and with fullness in the left ear.  He has tinnitus in both ears.  He denies prior ear surgery.       PAST MEDICAL HISTORY:   Past Medical History:   Diagnosis Date    Abnormal electrocardiogram (ECG) (EKG)     Abnormal EKG    Hyperlipidemia, unspecified 11/03/2013    Hyperlipidemia    Malignant neoplasm of colon, unspecified (CMS/HCC) 03/23/2021    Colon cancer    Other abnormal glucose 01/12/2016    Blood glucose abnormal    Other specified postprocedural states     H/O colonoscopy    Personal history of other diseases of male genital organs     History of erectile dysfunction    Personal history of other diseases of the musculoskeletal system and connective tissue     Personal history of arthritis    Personal history of other diseases of the nervous system and sense organs     History of cataract    Personal history of other diseases of the nervous system and sense organs     History of migraine    Personal history of other malignant neoplasm of large intestine     History of malignant neoplasm of colon    Personal history of other malignant neoplasm of skin     History of basal cell carcinoma (BCC)    Polyp of colon     Benign colon polyp       PAST SURGICAL HISTORY:   Past Surgical History:   Procedure Laterality Date    OTHER SURGICAL HISTORY  12/18/2013    Surgery Of The Eyelids  "   OTHER SURGICAL HISTORY  07/30/2019    Excision of basal cell carcinoma    OTHER SURGICAL HISTORY  07/30/2019    Cataract surgery    OTHER SURGICAL HISTORY  07/30/2019    Vasectomy    TONSILLECTOMY  12/18/2013    Tonsillectomy    VASECTOMY  12/18/2013    Surgery Vas Deferens Vasectomy       MEDICATIONS:   Current Outpatient Medications:     amLODIPine (Norvasc) 10 mg tablet, Take 1 tablet (10 mg) by mouth once daily., Disp: , Rfl:     aspirin 81 mg EC tablet, Take 1 tablet (81 mg) by mouth once daily., Disp: , Rfl:     atorvastatin (Lipitor) 20 mg tablet, Take 1 tablet (20 mg) by mouth once daily at bedtime., Disp: , Rfl:     biotin 1 mg capsule, Take 1 capsule (1 mg) by mouth once daily., Disp: , Rfl:     butalbital-acetaminophen-caff -40 mg tablet, Take 1 tablet by mouth every 8 hours., Disp: , Rfl:     lisinopril 40 mg tablet, Take 1 tablet (40 mg) by mouth once daily., Disp: 90 tablet, Rfl: 3    multivit-min-FA-lycopen-lutein (Centrum Silver Ultra Men's) 300-600-300 mcg tablet, Take 1 tablet by mouth 1 (one) time each day., Disp: , Rfl:     azithromycin (Zithromax) 250 mg tablet, Take by mouth once daily., Disp: , Rfl:     ALLERGIES:   Allergies   Allergen Reactions    Animal Dander Unknown    Bee Pollen Unknown    Grass Pollen Unknown       SOCIAL HISTORY:   reports that he has never smoked. He has never used smokeless tobacco. He reports current alcohol use of about 14.0 standard drinks of alcohol per week. He reports that he does not use drugs.    FAMILY HISTORY: family history includes Arthritis in his mother; Asthma in his father; COPD in his mother; Fibromyalgia in his mother.    PHYSICAL EXAM:    VITALS:   Vitals:    01/18/24 0908   Resp: 16   Temp: 36.5 °C (97.7 °F)   TempSrc: Temporal   Weight: 78.5 kg (173 lb)   Height: 1.778 m (5' 10\")        GENERAL: healthy, alert, well developed, well nourished, no distress, cooperative, appears chronologic age    Communicates with normal voice and without " hearing aids.    HEAD: atraumatic, normocephalic, no lesions    EYES: normal, PERRLA and EOM's intact    EARS:   Right ear demonstrates a patent external auditory canal with a normal tympanic membrane.    Left ear: demonstrates a patent external auditory canal with an intact tympanic membrane with myringosclerosis.  He is able to auto insufflate.   Castellanos tuning fork test lateralizes midline 512 Hz.   Rinne testing with a 512 Hz tuning fork: Right Air conduction > Bone conduction   Left Air conduction > Bone conduction      NOSE: nose shows no deformity, asymmetry, or inflammation, nasal mucosa normal.    ORAL CAVITY/OROPHARYNX: negative findings: lips normal without lesions, buccal mucosa normal, gums healthy, teeth intact, non-carious, palate normal, tongue midline and normal, soft palate, uvula, and tonsils normal    NECK AND SALIVARY GLANDS: Neck is supple without masses or lymphadenopathy. Palpation of the parotid and submandibular gland reveals no masses or tenderness to palpation.    CRANIAL NERVES: intact,   Facial nerve exam  is House - Brackmann 1- Normal Function on the right and 1- Normal Function on the left.    CARDIOVASCULAR: radial pulse is palpable and regular, no evidence of peripheral edema    PULMONARY: normal lung excursion, no evidence of retractions    DATA REVIEWED:  Audiogram  I reviewed the audiogram from 1/18/2024, which demonstrated left normal sloping to moderate sensorineural hearing loss and right mild rising to near normal and sloping to moderate sensorineural hearing loss.  WRS was 76% on the right and 84% on the left.  After reviewing multiple previous audiograms, there is significant fluctuation, particularly on the right side, but mostly stable hearing in both the left and right side.     MRI scan  I reviewed both the report and images from the MRI from 1/16/2023, which did not demonstrate retrocochlear pathology    IMPRESSION:   1) Bilateral fluctuating hearing loss, right worse  than left    PLAN:  I discussed and reviewed the patient's audiogram with him and also compared to prior audiograms.  He has had a slow drop in his word recognition score on the left, but overall he is thresholds have hold steady over the last 2 years.  I also discussed that the right ear is actually improved today compared to the audiogram from November.  I discussed that on my exam, I do not appreciate a tympanic membrane perforation or evidence of effusion.  I suspect that he may have had an infection at some point, and I hope that he will improve his sensation on the left ear.  Nevertheless, I do not recommend any surgical intervention for his ear.  Further, because his hearing is relatively stable over time, I do not recommend any steroid injections or oral steroids at this time.  I would like to see him back in 3 months with an audiogram to see where his hearing ends up to make sure that his hearing is stable.    Rajendra Uriarte MD

## 2024-01-29 ENCOUNTER — CLINICAL SUPPORT (OUTPATIENT)
Dept: AUDIOLOGY | Facility: CLINIC | Age: 79
End: 2024-01-29
Payer: MEDICARE

## 2024-01-29 DIAGNOSIS — H90.3 SENSORINEURAL HEARING LOSS (SNHL) OF BOTH EARS: Primary | ICD-10-CM

## 2024-01-29 PROCEDURE — V5011 HEARING AID FITTING/CHECKING: HCPCS | Performed by: AUDIOLOGIST

## 2024-01-29 NOTE — PROGRESS NOTES
Name: Jose Gamez  YOB: 1945  Age: 78 y.o.    Date of Evaluation:  01/29/2024   Jose, 78 years old, was seen for hearing aid programming. He recently had recurrent otitis media, COVID, and a bacterial infection that affected his hearing. He was seen by Dr. Uriarte on 1/18/2023 and a hearing test was completed at that time. Results indicates a mild to moderately-severe sensorineural hearing loss in the right ear and normal hearing sloping to a moderately-severe sensorineural hearing loss in the left ear.       He wears binaural Phonak AGATA hearing aids. He is here to have his hearing aids updated to his most recent audiogram.    Phonak Audeo [id number]  Right: [id number]  Left: [id number]    Warranty expires 9/14/2025     Hearing Aid Check:  Hearing aids were connected to the fitting software and updated to his most recent thresholds. Bandwidth was changed to Fixed Bandwidth due to Bluetooth streaming becoming disconnected and advised through  to change bandwidth.    Aided testing in the sound field indicates aided thresholds in the mild hearing loss range from 250-4000 Hz. Speech Reception Threshold obtained at 35 dB HL. Word understanding using CNC recorded word list at 55 dB HL repeated with 92% correct. Sentence understanding using [employer] recorded word list at 55 dB HL repeated with 99% correct.     Treatment Plan:  Follow-up with ENT as directed  Retest hearing in conjunction with medical management or if a change in hearing occurs  Daily use binaural amplification    Time: 3473-1203    DAVID Moyer, [facility]  Licensed Audiologist

## 2024-02-14 ENCOUNTER — OFFICE VISIT (OUTPATIENT)
Dept: CARDIOLOGY | Facility: CLINIC | Age: 79
End: 2024-02-14
Payer: MEDICARE

## 2024-02-14 VITALS
WEIGHT: 172 LBS | OXYGEN SATURATION: 100 % | HEIGHT: 70 IN | SYSTOLIC BLOOD PRESSURE: 126 MMHG | HEART RATE: 63 BPM | DIASTOLIC BLOOD PRESSURE: 70 MMHG | BODY MASS INDEX: 24.62 KG/M2

## 2024-02-14 DIAGNOSIS — R93.1 AGATSTON CAC SCORE, >400: ICD-10-CM

## 2024-02-14 DIAGNOSIS — I77.810 MILD ASCENDING AORTA DILATION (CMS-HCC): ICD-10-CM

## 2024-02-14 DIAGNOSIS — I25.10 ARTERIOSCLEROSIS OF CORONARY ARTERY: Primary | ICD-10-CM

## 2024-02-14 DIAGNOSIS — I77.89 ENLARGED THORACIC AORTA (CMS-HCC): ICD-10-CM

## 2024-02-14 DIAGNOSIS — I10 PRIMARY HYPERTENSION: ICD-10-CM

## 2024-02-14 PROCEDURE — 1036F TOBACCO NON-USER: CPT | Performed by: INTERNAL MEDICINE

## 2024-02-14 PROCEDURE — 3074F SYST BP LT 130 MM HG: CPT | Performed by: INTERNAL MEDICINE

## 2024-02-14 PROCEDURE — 1157F ADVNC CARE PLAN IN RCRD: CPT | Performed by: INTERNAL MEDICINE

## 2024-02-14 PROCEDURE — 3078F DIAST BP <80 MM HG: CPT | Performed by: INTERNAL MEDICINE

## 2024-02-14 PROCEDURE — 1159F MED LIST DOCD IN RCRD: CPT | Performed by: INTERNAL MEDICINE

## 2024-02-14 PROCEDURE — 99214 OFFICE O/P EST MOD 30 MIN: CPT | Performed by: INTERNAL MEDICINE

## 2024-02-14 PROCEDURE — 1160F RVW MEDS BY RX/DR IN RCRD: CPT | Performed by: INTERNAL MEDICINE

## 2024-02-14 PROCEDURE — 93010 ELECTROCARDIOGRAM REPORT: CPT | Performed by: INTERNAL MEDICINE

## 2024-02-14 PROCEDURE — 1126F AMNT PAIN NOTED NONE PRSNT: CPT | Performed by: INTERNAL MEDICINE

## 2024-02-14 PROCEDURE — 93005 ELECTROCARDIOGRAM TRACING: CPT | Performed by: INTERNAL MEDICINE

## 2024-02-14 ASSESSMENT — PAIN SCALES - GENERAL: PAINLEVEL: 0-NO PAIN

## 2024-02-14 NOTE — PATIENT INSTRUCTIONS
You were seen in the [facility] for your coronary artery disease with high calcium score.     Your September 2019 calcium score was 1054 which implies a high amount of atherosclerosis (hardening of the heart arteries). Based on your current risk factors (former smoking, cholesterol numbers), you had a 16% chance of having a heart attack in the next 10 years before we made changes.     I recommend that we do the following to reduce your heart attack risk:  1. Continue to take aspirin 81 mg a day for life. You can take enteric coated (EC) tablets    2. Continue to take your cholesterol medication, atorvastatin 20 mg a day. This medication has reduced your cholesterol numbers to goal (LDL, the bad cholesterol is down 50% from 132 prior to statin therapy to 63 in March 2023) and can help remove cholesterol plaque from the artery walls). Long term LDL goal < 70.    3. Moderate intensity exercise at least 3 times a week for 30-45 minutes a time. Exercise helps protect the heart and blood vessels. Your treadmill exercise is excellent and protecting your heart.    4. Eat a heart healthy diet. Limit portions of red meat. Eat fresh fruits and vegetables instead of processed carbohydrates. Olive oil (1 tablespoon a day) as a source of omega-3 fat. The Mediterranean diet has been shown in clinical trials to reduce risk of heart disease by following these principles.     Your August 2023 echocardiogram was unchanged from your August 2022 and August 2021 echocardiograms showing a stable aorta size of 4.1 cm. I recommend that we repeat this test to measure the size of your thoracic aorta again in August 2024 for a 1 year follow up study.      Your blood pressure has been at your long term goal range of 120-130 mm Hg with home readings 110s-120s mm Hg. Continue amlodipine 10 mg a day and lisinopril 40 mg a day.      Continue to check your blood pressure and keep a log book as you have been doing. Sit down  and rest for 3-5 minutes and then place your arm on the table to keep it level with your heart for best measurements.     Follow up with Dr. Contreras in 6 months.

## 2024-02-14 NOTE — PROGRESS NOTES
Subjective   Jose Gamez is a 78 y.o. male who presents to the [facility] for follow up of elevated CT calcium score and mild thoracic aorta enlargement. Last seen in August 2023.     Since our last visit, Mr. Gamez is stable without cardiac symptoms of active chest pain, dyspnea on exertion, PND, orthopnea, ERICK, palpitations, syncope, or claudication.     Is active outside and walks 3x/week on new home treadmill for 30 minutes. Has some progressive leg / back stiffness that cause mild shortness of breath correlating to increased oxygen cost of movement.     Ambulatory blood pressure recordings averaging 120s/70s mm Hg on patient home BP log book. Home record scanned into AE at visit today.     He had a 9/10/2019 CT calcium score that was very elevated at 1054. His 10 year FLORES risk score was 16% (intermediate-high risk) for MI prior to risk factor modification.      Past Medical History:  1. Coronary arteriosclerosis: 9/10/2019 CACS 1054 (LMCA 281, , LCx 0, ). FLORES 10 yr risk score 16%  2. Dyslipidemia: 3/7/2019 (no tx): ; TG 59; HDL 76; ; 1/6/2020 (atorva 20 mg): ; TG 47; HDL 79; LDL 67  3. Mild ascending aorta enlargement (4.1 cm 9/2019 CT, 2014 CT 4 cm). 8/11/2021 echo Asc Ao 4.1 cm; 8/4/2022 echo Asc Ao 4.1 cm.  4. h/o colon cancer     Social History:  Former smoker (quit in 1976, ~ 15 pack year history)     Family History:  No premature CAD in 1st degree relatives ( 55 years of age for male relatives, 65 years of age for female relatives)     Review of Systems    A 14 point review of systems was asked. All questions were negative except for pertinent positives listed in the HPI.      Objective   Physical Exam  BP Readings from Last 3 Encounters:   02/14/24 126/70   12/13/23 115/64   09/18/23 128/70      Wt Readings from Last 3 Encounters:   02/14/24 78 kg (172 lb)   01/18/24 78.5 kg (173 lb)   12/13/23 77.8 kg (171 lb 9.6 oz)     "  BMI: Estimated body mass index is 24.68 kg/m² as calculated from the following:    Height as of this encounter: 1.778 m (5' 10\").    Weight as of this encounter: 78 kg (172 lb).  BSA: Estimated body surface area is 1.96 meters squared as calculated from the following:    Height as of this encounter: 1.778 m (5' 10\").    Weight as of this encounter: 78 kg (172 lb).    General: no acute distress  HEENT: EOMI, no scleral icterus.  Lungs: Clear to auscultation bilaterally without wheezing, rales, or rhonchi.  Cardiovascular: Regular rhythm and rate. Normal S1 and S2. No murmurs, rubs, or gallops are appreciated. JVP normal.  Abdomen: Soft, nontender, nondistended. Bowel sounds present.  Extremities: Warm and well perfused with equal 2+ pulses bilaterally.  No edema present.  Neurologic: Alert and oriented x3.    I have personally reviewed the following images and laboratory findings:  Last echocardiogram:  Most recent echo, 2023: LVEF 55-60%, LV conc remodeling (LVMI 105 gm/m2), impaired relaxation diastology (E/e' 7), normal LA size (NANETTE 28 ml/m2), normal RV, mild MALIA (24 cm2), trivial AI, no AS, trace MR, trace TR, unable to estimate RVSP (prior  RVSP 26 mm Hg), RAP 3 mm Hg, Ao root 3.8 cm, Asc Ao 4.1 cm.    Last cath / stress test / CACS:  9/10/2019 CACS 1054 (LMCA 281, , LCx 0, ). FLORES 10 yr risk score 16%    Most recent EC2024 ECG: Sinus rhythm, 61 bpm, incomplete LBBB pattern ( msec), borderline abnormal ECG. Personally reviewed in office.    2023 ECG: Sinus rhythm, 54 bpm, borderline IVCD (QRS duration 112 msec), borderline ECG.      Assessment/Plan   1. Coronary arteriosclerosis:  9/10/2019 CT calcium score is 1054. FLORES 10 year risk score 16% based on risk factors at time of scan.     Will continue the followin. Continue aspirin 81 mg a day for life  2. Continue atorvastatin 20 mg a day. Goal LDL < 70 on 2023 lipid panel.   3. Lifestyle modification of " diet and exercise discussed in detail.     2. Thoracic aorta enlargement:  Stable size on August 2023 echocardiogram compared to August 2022 and August 2021 echo at 4.1 cm. Size has been stable at 4.1 cm since 2014. Repeat echocardiogram next visit for 1 year follow up imaging. Blood pressure and HR at goal to minimize aorta size progression.     3. Hypertension:  Blood pressure at goal on ambulatory monitoring with range 110s-120s mm Hg. SBP goal range 120-130 mm Hg. Continue amlodipine 10 mg and lisinopril 40 mg a day.     ECG shows progression of QRS widening to incomplete LBBB pattern. Likely hypertensive related. Will monitor with serial ECGs and correlate with annual echocardiogram monitoring thoracic aortic aneurysm.    Follow up with Dr. Contreras in 6 months with repeat echo.            SIGNATURE: Juancarlos Contreras MD PATIENT NAME: Jose Gamez   DATE/TIME: February 14, 2024 10:48 AM MRN: [id number]

## 2024-02-22 LAB
ATRIAL RATE: 61 BPM
P AXIS: 29 DEGREES
P OFFSET: 183 MS
P ONSET: 134 MS
PR INTERVAL: 172 MS
Q ONSET: 220 MS
QRS COUNT: 10 BEATS
QRS DURATION: 122 MS
QT INTERVAL: 406 MS
QTC CALCULATION(BAZETT): 408 MS
QTC FREDERICIA: 408 MS
R AXIS: -37 DEGREES
T AXIS: 20 DEGREES
T OFFSET: 423 MS
VENTRICULAR RATE: 61 BPM

## 2024-03-08 DIAGNOSIS — Z12.5 PROSTATE CANCER SCREENING: ICD-10-CM

## 2024-03-08 DIAGNOSIS — E78.00 HYPERCHOLESTEREMIA: ICD-10-CM

## 2024-03-08 DIAGNOSIS — R73.9 HYPERGLYCEMIA: Primary | ICD-10-CM

## 2024-03-11 ENCOUNTER — LAB (OUTPATIENT)
Dept: LAB | Facility: LAB | Age: 79
End: 2024-03-11
Payer: MEDICARE

## 2024-03-11 DIAGNOSIS — I10 PRIMARY HYPERTENSION: ICD-10-CM

## 2024-03-11 DIAGNOSIS — Z12.5 PROSTATE CANCER SCREENING: ICD-10-CM

## 2024-03-11 DIAGNOSIS — R73.9 HYPERGLYCEMIA: ICD-10-CM

## 2024-03-11 DIAGNOSIS — E78.00 HYPERCHOLESTEREMIA: ICD-10-CM

## 2024-03-11 LAB
ALBUMIN SERPL BCP-MCNC: 4.3 G/DL (ref 3.4–5)
ALP SERPL-CCNC: 70 U/L (ref 33–136)
ALT SERPL W P-5'-P-CCNC: 21 U/L (ref 10–52)
ANION GAP SERPL CALC-SCNC: 12 MMOL/L (ref 10–20)
AST SERPL W P-5'-P-CCNC: 29 U/L (ref 9–39)
BASOPHILS # BLD AUTO: 0.04 X10*3/UL (ref 0–0.1)
BASOPHILS NFR BLD AUTO: 0.8 %
BILIRUB SERPL-MCNC: 0.7 MG/DL (ref 0–1.2)
BUN SERPL-MCNC: 13 MG/DL (ref 6–23)
CALCIUM SERPL-MCNC: 9.3 MG/DL (ref 8.6–10.6)
CHLORIDE SERPL-SCNC: 99 MMOL/L (ref 98–107)
CHOLEST SERPL-MCNC: 145 MG/DL (ref 0–199)
CHOLESTEROL/HDL RATIO: 1.8
CO2 SERPL-SCNC: 27 MMOL/L (ref 21–32)
CREAT SERPL-MCNC: 0.78 MG/DL (ref 0.5–1.3)
EGFRCR SERPLBLD CKD-EPI 2021: >90 ML/MIN/1.73M*2
EOSINOPHIL # BLD AUTO: 0.36 X10*3/UL (ref 0–0.4)
EOSINOPHIL NFR BLD AUTO: 7.5 %
ERYTHROCYTE [DISTWIDTH] IN BLOOD BY AUTOMATED COUNT: 11.5 % (ref 11.5–14.5)
EST. AVERAGE GLUCOSE BLD GHB EST-MCNC: 97 MG/DL
GLUCOSE SERPL-MCNC: 131 MG/DL (ref 74–99)
HBA1C MFR BLD: 5 %
HCT VFR BLD AUTO: 42.2 % (ref 41–52)
HDLC SERPL-MCNC: 82 MG/DL
HGB BLD-MCNC: 14.3 G/DL (ref 13.5–17.5)
IMM GRANULOCYTES # BLD AUTO: 0.01 X10*3/UL (ref 0–0.5)
IMM GRANULOCYTES NFR BLD AUTO: 0.2 % (ref 0–0.9)
LDLC SERPL CALC-MCNC: 54 MG/DL
LYMPHOCYTES # BLD AUTO: 2.24 X10*3/UL (ref 0.8–3)
LYMPHOCYTES NFR BLD AUTO: 46.6 %
MCH RBC QN AUTO: 31.9 PG (ref 26–34)
MCHC RBC AUTO-ENTMCNC: 33.9 G/DL (ref 32–36)
MCV RBC AUTO: 94 FL (ref 80–100)
MONOCYTES # BLD AUTO: 0.46 X10*3/UL (ref 0.05–0.8)
MONOCYTES NFR BLD AUTO: 9.6 %
NEUTROPHILS # BLD AUTO: 1.7 X10*3/UL (ref 1.6–5.5)
NEUTROPHILS NFR BLD AUTO: 35.3 %
NON HDL CHOLESTEROL: 63 MG/DL (ref 0–149)
NRBC BLD-RTO: 0 /100 WBCS (ref 0–0)
PLATELET # BLD AUTO: 254 X10*3/UL (ref 150–450)
POTASSIUM SERPL-SCNC: 4.8 MMOL/L (ref 3.5–5.3)
PROT SERPL-MCNC: 6.6 G/DL (ref 6.4–8.2)
PSA SERPL-MCNC: 0.38 NG/ML
RBC # BLD AUTO: 4.48 X10*6/UL (ref 4.5–5.9)
SODIUM SERPL-SCNC: 133 MMOL/L (ref 136–145)
TRIGL SERPL-MCNC: 44 MG/DL (ref 0–149)
VLDL: 9 MG/DL (ref 0–40)
WBC # BLD AUTO: 4.8 X10*3/UL (ref 4.4–11.3)

## 2024-03-11 PROCEDURE — 36415 COLL VENOUS BLD VENIPUNCTURE: CPT

## 2024-03-11 PROCEDURE — 80061 LIPID PANEL: CPT

## 2024-03-11 PROCEDURE — G0103 PSA SCREENING: HCPCS

## 2024-03-11 PROCEDURE — 83036 HEMOGLOBIN GLYCOSYLATED A1C: CPT

## 2024-03-11 PROCEDURE — 80053 COMPREHEN METABOLIC PANEL: CPT

## 2024-03-11 PROCEDURE — 85025 COMPLETE CBC W/AUTO DIFF WBC: CPT

## 2024-03-18 ENCOUNTER — HOSPITAL ENCOUNTER (OUTPATIENT)
Dept: RADIOLOGY | Facility: CLINIC | Age: 79
Discharge: HOME | End: 2024-03-18
Payer: MEDICARE

## 2024-03-18 ENCOUNTER — OFFICE VISIT (OUTPATIENT)
Dept: PRIMARY CARE | Facility: CLINIC | Age: 79
End: 2024-03-18
Payer: MEDICARE

## 2024-03-18 VITALS
DIASTOLIC BLOOD PRESSURE: 65 MMHG | HEART RATE: 60 BPM | TEMPERATURE: 97.1 F | OXYGEN SATURATION: 96 % | HEIGHT: 69 IN | WEIGHT: 172.4 LBS | SYSTOLIC BLOOD PRESSURE: 110 MMHG | BODY MASS INDEX: 25.53 KG/M2

## 2024-03-18 DIAGNOSIS — M25.551 RIGHT HIP PAIN: Primary | ICD-10-CM

## 2024-03-18 DIAGNOSIS — M25.551 RIGHT HIP PAIN: ICD-10-CM

## 2024-03-18 PROBLEM — D69.6 LOW PLATELET COUNT (CMS-HCC): Status: ACTIVE | Noted: 2023-02-03

## 2024-03-18 PROBLEM — D69.6 LOW PLATELET COUNT (CMS-HCC): Status: RESOLVED | Noted: 2023-02-03 | Resolved: 2024-03-18

## 2024-03-18 PROBLEM — C18.0 ADENOCARCINOMA OF CECUM (MULTI): Status: RESOLVED | Noted: 2023-02-03 | Resolved: 2024-03-18

## 2024-03-18 PROCEDURE — 1157F ADVNC CARE PLAN IN RCRD: CPT | Performed by: INTERNAL MEDICINE

## 2024-03-18 PROCEDURE — 1036F TOBACCO NON-USER: CPT | Performed by: INTERNAL MEDICINE

## 2024-03-18 PROCEDURE — 73502 X-RAY EXAM HIP UNI 2-3 VIEWS: CPT | Mod: RT

## 2024-03-18 PROCEDURE — 3074F SYST BP LT 130 MM HG: CPT | Performed by: INTERNAL MEDICINE

## 2024-03-18 PROCEDURE — 1159F MED LIST DOCD IN RCRD: CPT | Performed by: INTERNAL MEDICINE

## 2024-03-18 PROCEDURE — 1160F RVW MEDS BY RX/DR IN RCRD: CPT | Performed by: INTERNAL MEDICINE

## 2024-03-18 PROCEDURE — G0439 PPPS, SUBSEQ VISIT: HCPCS | Performed by: INTERNAL MEDICINE

## 2024-03-18 PROCEDURE — 99214 OFFICE O/P EST MOD 30 MIN: CPT | Performed by: INTERNAL MEDICINE

## 2024-03-18 PROCEDURE — 73502 X-RAY EXAM HIP UNI 2-3 VIEWS: CPT | Mod: RIGHT SIDE

## 2024-03-18 PROCEDURE — 3078F DIAST BP <80 MM HG: CPT | Performed by: INTERNAL MEDICINE

## 2024-03-18 PROCEDURE — 1170F FXNL STATUS ASSESSED: CPT | Performed by: INTERNAL MEDICINE

## 2024-03-18 ASSESSMENT — ENCOUNTER SYMPTOMS
DIARRHEA: 0
SLEEP DISTURBANCE: 0
BACK PAIN: 1
ABDOMINAL PAIN: 0
CONSTIPATION: 0
FREQUENCY: 0
BLOOD IN STOOL: 0

## 2024-03-18 ASSESSMENT — PATIENT HEALTH QUESTIONNAIRE - PHQ9
1. LITTLE INTEREST OR PLEASURE IN DOING THINGS: NOT AT ALL
SUM OF ALL RESPONSES TO PHQ9 QUESTIONS 1 AND 2: 0
2. FEELING DOWN, DEPRESSED OR HOPELESS: NOT AT ALL

## 2024-03-18 ASSESSMENT — ACTIVITIES OF DAILY LIVING (ADL)
DRESSING: INDEPENDENT
GROCERY_SHOPPING: INDEPENDENT
DOING_HOUSEWORK: INDEPENDENT
MANAGING_FINANCES: INDEPENDENT
TAKING_MEDICATION: INDEPENDENT
BATHING: INDEPENDENT

## 2024-03-18 NOTE — PROGRESS NOTES
Subjective   Patient ID: Jose Gamez is a 78 y.o. male who presents for Medicare Annual Wellness Visit Subsequent.    The patient has recovered from a moderate URI in 11/2023 that lasted until 1/2024 before subsiding.  He tested for a number of viruses including Covid-19 and Influenza A/B.    The patient states that the left knee pain has resolved following a corticosteroid injection years prior.  He previously followed with  from Orthopedic Surgery.    The patient mentions recurrence of low back pain, particularly on the right side.  This is associated with right hip pain.  He finds that he is unable sleep on his right side as a result.  The patient previously followed with  from [facility], and recalls that was quite helpful in the past.    The patient notes weakened urinary stream in the morning alone, and denies any other significant urinary symptoms.    The patient wears a hearing aid device that is working well.  He follows regularly with a Dentist, and reports good sleep quality depending on right hip pain.  The patient denies any abdominal pain, hematochezia, melena, or bowel problems.       Review of Systems   Gastrointestinal:  Negative for abdominal pain, blood in stool, constipation and diarrhea.   Genitourinary:  Negative for frequency.        Positive for weakened urinary stream in the mornings alone.   Musculoskeletal:  Positive for back pain.        Positive for right hip pain.   Psychiatric/Behavioral:  Negative for sleep disturbance.      Objective   Physical Exam  Constitutional:       Appearance: Normal appearance.   Neck:      Vascular: No carotid bruit.   Cardiovascular:      Rate and Rhythm: Normal rate and regular rhythm.      Heart sounds: Normal heart sounds.   Pulmonary:      Effort: Pulmonary effort is normal.      Breath sounds: Normal breath sounds.   Abdominal:      General: Bowel sounds are normal.      Palpations: Abdomen is soft.      Tenderness: There is no  abdominal tenderness.   Skin:     General: Skin is warm and dry.   Neurological:      General: No focal deficit present.      Mental Status: He is alert and oriented to person, place, and time. Mental status is at baseline.   Psychiatric:         Mood and Affect: Mood normal.         Behavior: Behavior normal.         Assessment/Plan   Problem List Items Addressed This Visit    None  Visit Diagnoses         Codes    Right hip pain    -  Primary M25.551    Relevant Orders    XR hip right with pelvis when performed 2 or 3 views    Referral to [facility]            Medicare Wellness Examination Done  -  Discussed healthy diet and regular exercise.    -  Physical exam overall unremarkable. Immunizations reviewed and updated accordingly. Healthy lifestyle choices discussed (tobacco avoidance, appropriate alcohol use, avoidance of illicit substances).   -  Patient is wearing seatbelt.   -  Screening lab work ordered as indicated.    -  Age appropriate screening tests reviewed with patient.       IMPRESSION/PLAN:     Right Hip Pain  - Suspect bursitis.  Ordered Xray of Right hip.  Advised patient to try Voltaren gel OTC at night time for now. Ordered new referral with  from [facility], as this was quite helpful in the past.    Back stiffness  - Advised patient to restart physical therapy exercises at home.  Ordered new referral with  from [facility], as this was quite helpful in the past. Call the clinic if symptoms persist or worsen.     HTN   - /65 in the office today.  Continue on lisinopril 40mg QD and amlodipine 10mg QD.      HLD   - Stable, continue on atorvastatin 20mg QD.  ASCVD 29.4% per 3/2024.     CAD   - Following with Dr. Contreras in Cardiology, takes ASA 81mg QD. Echocardiogram performed 8/2021 showed impaired relaxation patter of left ventricular diastolic filling and left atrium with mild-moderate dilation. Repeat in 8/2022 stable.     Elevated Glucose   - Last a1c 5.0% - 3/2024. Glucose  elevated at 131 per 3/2024 CMP     Tension Headaches   - Previously took Fioricet -40mg as needed for onset of headache.     Left Knee Pain/Swelling  - Follows with  from Orthopedic Surgery.     History of colon cancer  - has appointment set up w/ Dr. Thayer     [facility]   - Routine labs 3/2024. Last PSA wnl 3/2024.  Last colonoscopy 12/2022, repeat due 12/2025 due to poor prep.       Follow-up in 6 months, call sooner if needed.       Scribe Attestation  By signing my name below, I, Darrion Florentino   attest that this documentation has been prepared under the direction and in the presence of Perry Samaniego DO.   Renetta Pettit 03/18/24 8:31 AM

## 2024-03-26 ENCOUNTER — ALLIED HEALTH (OUTPATIENT)
Dept: INTEGRATIVE MEDICINE | Facility: CLINIC | Age: 79
End: 2024-03-26
Payer: MEDICARE

## 2024-03-26 DIAGNOSIS — M99.02 SEGMENTAL AND SOMATIC DYSFUNCTION OF THORACIC REGION: ICD-10-CM

## 2024-03-26 DIAGNOSIS — M53.3 SACRAL BACK PAIN: ICD-10-CM

## 2024-03-26 DIAGNOSIS — M99.03 SEGMENTAL AND SOMATIC DYSFUNCTION OF LUMBAR REGION: ICD-10-CM

## 2024-03-26 DIAGNOSIS — M54.50 MIDLINE LOW BACK PAIN WITHOUT SCIATICA, UNSPECIFIED CHRONICITY: Primary | ICD-10-CM

## 2024-03-26 DIAGNOSIS — M99.04 SEGMENTAL AND SOMATIC DYSFUNCTION OF SACRAL REGION: ICD-10-CM

## 2024-03-26 DIAGNOSIS — M99.05 SEGMENTAL AND SOMATIC DYSFUNCTION OF PELVIC REGION: ICD-10-CM

## 2024-03-26 DIAGNOSIS — M47.817 SPONDYLOSIS OF LUMBOSACRAL REGION WITHOUT MYELOPATHY OR RADICULOPATHY: ICD-10-CM

## 2024-03-26 PROCEDURE — 99213 OFFICE O/P EST LOW 20 MIN: CPT | Performed by: CHIROPRACTOR

## 2024-03-26 PROCEDURE — 98941 CHIROPRACT MANJ 3-4 REGIONS: CPT | Performed by: CHIROPRACTOR

## 2024-03-26 ASSESSMENT — ENCOUNTER SYMPTOMS
WEAKNESS: 0
VOMITING: 0
SHORTNESS OF BREATH: 0
CONFUSION: 0
WOUND: 0
DIZZINESS: 0
FATIGUE: 0
NAUSEA: 0
DYSURIA: 0
APPETITE CHANGE: 0
FEVER: 0

## 2024-03-26 NOTE — PROGRESS NOTES
Subjective   Patient ID: Jose Gamez is a 78 y.o. male who presents today for the examination and treatment of their R lower back and hip pain.    This is visit 1 of the calendar year. Medicare.    Fall risk: None. No falls in the last 6 months.     HPI -It has been approximately 2 years since Jose's last chiropractic appointment here at [facility].  He did recently see his primary care physician, Perry Samaniego, for his Medicare annual wellness visit.  Overall he is doing well but has had some recurrence of his lower back pain issues on the right side which were successfully treated in the past with chiropractic manipulative therapies.  For this reason he was encouraged to follow-up here for additional treatment and management of his pain.    Pain at the R greater trochanter, R> L high hamstring region and lumbosacral spine.  Pain is more pronounced first thing in the morning and he is extremely stiff for quite some time in the morning until things start to loosen up.  He also reports that he is unable to walk and be as active as he would like due to the pain and decreased stamina.  He has a treadmill and bike at home but just cannot seem to get motivated to utilize them.  However, he does have a big river boat cruise trip with his brother in June and would like to have more stamina and be able to explore more easily without being nervous of fatigue.    He has continued with some of his home exercises including a lunge stretch which was given to him several years ago.  This does help but he also feels that the manual therapy provided additional/measurable improvement in management of his pain.  There is no numbness or tingling into the legs or feet.  Occasionally he will have charley horses.  He does find it difficult to find comfortable positions to sleep and due to the right hip/bursitis pain.  He finds that he has to lay on his back which is not his preferred position to sleep  in.    Patient describes the pain as achiness, stiffness, and dull, and rates the current pain 1 out of 10 (10 being the worst). Up to 6/10   ____  INITIAL INTAKE/SUBJECTIVE 06/29/2022: He was referred here by his primary care physician after having his annual wellness exam in March. At this time Jose was reporting new ongoing lower back stiffness which started in the spring 2021. The pain continues over a year later and has started to affect his ability to walk and exercise as he would like. Pain is described as mild to moderate at times and is felt daily. He does explain that the pain and stiffness has gotten better since when he made the appointment. The hamstrings are not as stiff as they were. He also explains that when trying to walk and stand if he put his knuckles into the lower back it seemed to alleviate some of the tension and pain.     There is no specific trigger or mechanism of injury that he can recall. Pain is not sharp but more of an intense stiffness at the lumbosacral region that gets progressively worse, then the bilateral hamstrings will get really tight. He does not report any numbness tingling or weakness. No bowel or bladder issues related or worse since the onset of this pain.     Past medical history: Jose does have history of an enlarged thoracic aorta, high blood pressure, high cholesterol, high glucose, and reoccurring skin cancers and colon cancer.   ____  Review of Systems   Constitutional:  Negative for appetite change, fatigue and fever.   HENT:  Negative for congestion and hearing loss.    Eyes:  Negative for visual disturbance.   Respiratory:  Negative for shortness of breath.    Cardiovascular:  Negative for chest pain.        Hypertension, treated with medications by PCP; he does follow-up with cardiology for coronary artery disease.  Recent tests have been stable.   Gastrointestinal:  Negative for nausea and vomiting.        Morning urinary weakness.  Being followed by his  PCP.   Genitourinary:  Negative for dysuria.   Musculoskeletal:         Past left knee pain treated with injections by orthopedic.   Skin:  Negative for rash and wound.   Neurological:  Negative for dizziness and weakness.   Hematological:         High cholesterol, treated with medications from PCP; elevated blood glucose is being monitored   Psychiatric/Behavioral:  Negative for confusion.    All other systems reviewed and are negative.    Right hip and pelvis x-rays 3/18/2024: Findings include mild degenerative changes with no acute osseous abnormality.  Results were sent to the patient by Dr. Brock 3/23/2024.    Objective   Observation : hyperkyphosis    Physical Exam  Constitutional:       General: He is not in acute distress.     Appearance: Normal appearance.   Musculoskeletal:      Thoracic back: Tenderness present. No bony tenderness. Decreased range of motion.      Lumbar back: Tenderness present. No bony tenderness. Decreased range of motion. Negative right straight leg raise test and negative left straight leg raise test. No scoliosis.        Back:    Neurological:      Mental Status: He is oriented to person, place, and time.      Motor: Motor function is intact.      Coordination: Coordination is intact.      Deep Tendon Reflexes:      Reflex Scores:       Bicep reflexes are 2+ on the right side and 2+ on the left side.       Brachioradialis reflexes are 1+ on the right side and 1+ on the left side.       Patellar reflexes are 2+ on the right side and 2+ on the left side.       Achilles reflexes are 1+ on the right side and 1+ on the left side.  Psychiatric:         Behavior: Behavior normal.     Examination findings (palpation & ROM): Tenderness, hypertonicity and trigger points palpated throughout the right greater than left iliolumbar region, right greater than left SI joint, right greater than left gluteal musculature.  Additional right greater than left hip flexor tightness.  There is a thoracic  kyphosis but this is mild.    Segmental joint dysfunction was identified in the following areas using motion palpation and/or pain provocation assessment:  Thoracic: T7, T8 and T9.   Lumbar: L5.   Sacrum: L5/S1, right SI and left SI.     Today's treatment:  Performed spinal manipulation to the regions of segmental dysfunction identified on examination using age-appropriate and injury specific force, and manual diversified technique. Technique Used: activator/tool assisted technique and pelvic drop table technique.       Manual Therapy (89115), Prone MFR and IC to the lumbosacral region and into the BL L/S region, BL SIJs and BL hamstrings. Prone hip flexor stretching.      Reinitiate sphinx pose to encourage gentle L/S extension. Standing hamstring stretch with stair.       Treatment Plan:   The patient and I discussed the risks and benefits of chiropractic care. Based on the patient's subjective complaints along with the examination findings, it is advised that a course of chiropractic treatment by initiated. Consent for care was given both written and orally by the patient. The patient tolerated today's treatment with little or no additional discomfort and was instructed to contact the office for questions or concerns.     Treatment Frequency: Will see/treat patient every 2-4 weeks as therapeutic benefit is sustained, then frequency will be reduced to as needed for symptom management or as needed for acute flare-ups/exacerbation.     Please note: Voice-to-text software was used when completing this note.  While the note was proofread, portions may include grammatical errors.  Please contact me with any questions/concerns as it relates to these types of errors.

## 2024-03-29 ENCOUNTER — APPOINTMENT (OUTPATIENT)
Dept: INTEGRATIVE MEDICINE | Facility: CLINIC | Age: 79
End: 2024-03-29
Payer: MEDICARE

## 2024-04-11 ENCOUNTER — ALLIED HEALTH (OUTPATIENT)
Dept: INTEGRATIVE MEDICINE | Facility: CLINIC | Age: 79
End: 2024-04-11
Payer: MEDICARE

## 2024-04-11 DIAGNOSIS — M54.50 MIDLINE LOW BACK PAIN WITHOUT SCIATICA, UNSPECIFIED CHRONICITY: Primary | ICD-10-CM

## 2024-04-11 DIAGNOSIS — M99.02 SEGMENTAL AND SOMATIC DYSFUNCTION OF THORACIC REGION: ICD-10-CM

## 2024-04-11 DIAGNOSIS — M53.3 SACRAL BACK PAIN: ICD-10-CM

## 2024-04-11 DIAGNOSIS — M99.04 SEGMENTAL AND SOMATIC DYSFUNCTION OF SACRAL REGION: ICD-10-CM

## 2024-04-11 DIAGNOSIS — M47.817 SPONDYLOSIS OF LUMBOSACRAL REGION WITHOUT MYELOPATHY OR RADICULOPATHY: ICD-10-CM

## 2024-04-11 DIAGNOSIS — M99.05 SEGMENTAL AND SOMATIC DYSFUNCTION OF PELVIC REGION: ICD-10-CM

## 2024-04-11 DIAGNOSIS — M99.03 SEGMENTAL AND SOMATIC DYSFUNCTION OF LUMBAR REGION: ICD-10-CM

## 2024-04-11 PROCEDURE — 98941 CHIROPRACT MANJ 3-4 REGIONS: CPT | Performed by: CHIROPRACTOR

## 2024-04-11 ASSESSMENT — ENCOUNTER SYMPTOMS
CONFUSION: 0
FEVER: 0
SHORTNESS OF BREATH: 0
APPETITE CHANGE: 0
WOUND: 0
NAUSEA: 0
WEAKNESS: 0
DIZZINESS: 0
FATIGUE: 0
DYSURIA: 0
VOMITING: 0

## 2024-04-11 NOTE — PROGRESS NOTES
Subjective   Patient ID: Jose Gamez is a 78 y.o. male who presents today for the treatment of their R lower back and hip pain.    This is visit 2 of the calendar year. Medicare.    Fall risk: None. No falls in the last 6 months.     HPI -he is feeling much better after our last treatment.  He has been enjoying the sphinx pose and he has found that the extension stretching is helpful between treatment sessions.  We will continue with the same protocol as it has proven beneficial.    REEXAM 3/26/24: It has been approximately 2 years since Jose's last chiropractic appointment here at [facility].  He did recently see his primary care physician, Perry Samaniego, for his Medicare annual wellness visit.  Overall he is doing well but has had some recurrence of his lower back pain issues on the right side which were successfully treated in the past with chiropractic manipulative therapies.  For this reason he was encouraged to follow-up here for additional treatment and management of his pain.    Pain at the R greater trochanter, R> L high hamstring region and lumbosacral spine.  Pain is more pronounced first thing in the morning and he is extremely stiff for quite some time in the morning until things start to loosen up.  He also reports that he is unable to walk and be as active as he would like due to the pain and decreased stamina.  He has a treadmill and bike at home but just cannot seem to get motivated to utilize them.  However, he does have a big river boat cruise trip with his brother in June and would like to have more stamina and be able to explore more easily without being nervous of fatigue.    He has continued with some of his home exercises including a lunge stretch which was given to him several years ago.  This does help but he also feels that the manual therapy provided additional/measurable improvement in management of his pain.  There is no numbness or tingling into the legs or feet.   Occasionally he will have charley horses.  He does find it difficult to find comfortable positions to sleep and due to the right hip/bursitis pain.  He finds that he has to lay on his back which is not his preferred position to sleep in.    Patient describes the pain as achiness, stiffness, and dull, and rates the current pain 1 out of 10 (10 being the worst). Up to 6/10   ____  INITIAL INTAKE/SUBJECTIVE 06/29/2022: He was referred here by his primary care physician after having his annual wellness exam in March. At this time Jose was reporting new ongoing lower back stiffness which started in the spring 2021. The pain continues over a year later and has started to affect his ability to walk and exercise as he would like. Pain is described as mild to moderate at times and is felt daily. He does explain that the pain and stiffness has gotten better since when he made the appointment. The hamstrings are not as stiff as they were. He also explains that when trying to walk and stand if he put his knuckles into the lower back it seemed to alleviate some of the tension and pain.     There is no specific trigger or mechanism of injury that he can recall. Pain is not sharp but more of an intense stiffness at the lumbosacral region that gets progressively worse, then the bilateral hamstrings will get really tight. He does not report any numbness tingling or weakness. No bowel or bladder issues related or worse since the onset of this pain.     Past medical history: Jose does have history of an enlarged thoracic aorta, high blood pressure, high cholesterol, high glucose, and reoccurring skin cancers and colon cancer.   ____  Review of Systems   Constitutional:  Negative for appetite change, fatigue and fever.   HENT:  Negative for congestion and hearing loss.    Eyes:  Negative for visual disturbance.   Respiratory:  Negative for shortness of breath.    Cardiovascular:  Negative for chest pain.        Hypertension,  treated with medications by PCP; he does follow-up with cardiology for coronary artery disease.  Recent tests have been stable.   Gastrointestinal:  Negative for nausea and vomiting.        Morning urinary weakness.  Being followed by his PCP.   Genitourinary:  Negative for dysuria.   Musculoskeletal:         Past left knee pain treated with injections by orthopedic.   Skin:  Negative for rash and wound.   Neurological:  Negative for dizziness and weakness.   Hematological:         High cholesterol, treated with medications from PCP; elevated blood glucose is being monitored   Psychiatric/Behavioral:  Negative for confusion.    All other systems reviewed and are negative.    Right hip and pelvis x-rays 3/18/2024: Findings include mild degenerative changes with no acute osseous abnormality.  Results were sent to the patient by Dr. Brock 3/23/2024.    Objective   Observation : hyperkyphosis    Physical ExamExamination findings (palpation & ROM): Tenderness, hypertonicity and trigger points palpated throughout the right greater than left iliolumbar region, right greater than left SI joint, right greater than left gluteal musculature.  Additional right greater than left hip flexor tightness.  There is a thoracic kyphosis but this is mild.    Segmental joint dysfunction was identified in the following areas using motion palpation and/or pain provocation assessment:  Thoracic: T7, T8 and T9.   Lumbar: L5.   Sacrum: L5/S1, right SI and left SI.     Today's treatment:  Performed spinal manipulation to the regions of segmental dysfunction identified on examination using age-appropriate and injury specific force, and manual diversified technique. Technique Used: Gentle prone joint mobilization in the thoracic spine and pelvic drop table technique.       Manual Therapy (18977), Prone MFR and IC to the lumbosacral region and into the BL L/S region, BL SIJs and BL hamstrings. Prone hip flexor stretching.      Continue sphinx  pose to encourage gentle L/S extension. Standing hamstring stretch with stair.       Treatment Plan:   The patient and I discussed the risks and benefits of chiropractic care. Based on the patient's subjective complaints along with the examination findings, it is advised that a course of chiropractic treatment by initiated. Consent for care was given both written and orally by the patient. The patient tolerated today's treatment with little or no additional discomfort and was instructed to contact the office for questions or concerns.     Treatment Frequency: Will see/treat patient every 2-4 weeks as therapeutic benefit is sustained, then frequency will be reduced to as needed for symptom management or as needed for acute flare-ups/exacerbation.     Please note: Voice-to-text software was used when completing this note.  While the note was proofread, portions may include grammatical errors.  Please contact me with any questions/concerns as it relates to these types of errors.

## 2024-04-17 NOTE — PROGRESS NOTES
"AUDIOLOGY ADULT AUDIOMETRIC EVALUATION      Name:  Jose Gamez  :  1945  Age:  78 y.o.  Date of Evaluation:  2024     HISTORY  Reason for visit:  left hearing loss  Mr. Gamez is seen 24 at the request of Rajendra Uriarte M.D. for an evaluation of hearing.      Chief complaint:    - history of worsening of left hearing loss around 2023; left hearing has reportedly improved since previous audiogram of 2024    - he went snorkeling around 2023  - he experienced bacterial chest infection and lost left hearing   - there was concern for left tympanic membrane perforation   - he has a diagnosis of right cochlear hydrops    Hearing loss:  left hearing has reportedly improved since previous audiogram of 2024; audiogram of 2024 showed asymmetric (right worse than left) sensorineural hearing loss.    - Patient has a history of hearing loss for years (first tried hearing aids around ); hearing has been asymmetric (right worse than left) since around 2023.    Tinnitus:   yes, bilateral; not bothersome; no change since previous audiogram  Otitis Media: denies  Otologic surgical history:  denies  Dizziness/imbalance:  denies  Otalgia:  denies  Ear pressure/fullness:  bilateral ear fullness has essentially resolved  History of excessive noise exposure:  yes, woodworking tools, currently with hearing protection    Hearing aid history:  Patient uses bilateral Phonak Audeo P50-R RITE aids (SN Right: [id number]; Left: [id number]); Warranty expires 2025          EVALUATION  Please find audiogram in \"Media\" tab (Document Type:  Audiology Report) or included at the bottom of this note.    RESULTS   Otoscopic Evaluation: clear canals bilaterally; right ear canal redness/irritation     Immittance Measures (226 Hz probe tone):     Right ear:  Tympanometry is consistent with normal middle ear pressure and normal tympanic membrane mobility.    Left ear: Tympanometry " is consistent with normal middle ear pressure and restricted tympanic membrane mobility.      Ipsilateral acoustic reflexes (500-4000 Hz) are present for 500-2000 Hz (absent 4000 Hz) for the right ear and are present for 1000 Hz (absent 500 and 8148-1221 Hz) for the left ear.       Test technique:  standard behavioral technique via insert earphones.  Reliability is good.    Pure Tone Audiometry: Hearing sensitivity is in the normal hearing to moderately-severe hearing loss range bilaterally.     Note asymmetry for 125-500 and 1269-8519 Hz (right worse than left)     Speech Audiometry:        Right Ear:  Speech Reception Threshold (SRT) was obtained at 30 dBHL                 Speech discrimination score was 92% in quiet when words were presented at 80 dBHL      Left Ear:  Speech Reception Threshold (SRT) was obtained at 15 dBHL                 Speech discrimination score was 92% in quiet when words were presented at 65 dBHL    IMPRESSIONS:  In comparison with previous audiogram of 1/18/2024, there has been slight (5-10dB) improvement for 125-2000 Hz on the right and for 125-3000 Hz on the left.      Patient is expected to have communication difficulty in adverse listening environments.    Patient is expected to continue to benefit from devices that provide amplification (e.g., hearing aids) and improve the desired sound signal over that of background noise, as well as from effective communication strategies.        RECOMMENDATIONS  Continue with otologic follow-up with Rajendra Uriarte M.D.   Reassess hearing in 1 year (or sooner if medically indicated or if there is a concern for a change in hearing).    Continue with hearing aid use.   Hearing Aid Check as needed.    Continue with medical follow-up as indicated.       PATIENT EDUCATION  Discussed results and recommendations with patient.  Questions were addressed and the patient was encouraged to contact our department should concerns arise.       Sweetie Moran,  DAVID, [facility]  Licensed Audiologist

## 2024-04-18 ENCOUNTER — CLINICAL SUPPORT (OUTPATIENT)
Dept: AUDIOLOGY | Facility: CLINIC | Age: 79
End: 2024-04-18
Payer: MEDICARE

## 2024-04-18 ENCOUNTER — OFFICE VISIT (OUTPATIENT)
Dept: OTOLARYNGOLOGY | Facility: CLINIC | Age: 79
End: 2024-04-18
Payer: MEDICARE

## 2024-04-18 VITALS
RESPIRATION RATE: 16 BRPM | OXYGEN SATURATION: 99 % | SYSTOLIC BLOOD PRESSURE: 116 MMHG | WEIGHT: 173.8 LBS | BODY MASS INDEX: 25.74 KG/M2 | DIASTOLIC BLOOD PRESSURE: 68 MMHG | HEART RATE: 55 BPM | HEIGHT: 69 IN | TEMPERATURE: 98.3 F

## 2024-04-18 DIAGNOSIS — H90.3 ASYMMETRIC SNHL (SENSORINEURAL HEARING LOSS): Primary | ICD-10-CM

## 2024-04-18 DIAGNOSIS — H81.03 COCHLEAR HYDROPS OF BOTH EARS: ICD-10-CM

## 2024-04-18 DIAGNOSIS — H90.A21 SENSORINEURAL HEARING LOSS (SNHL) OF RIGHT EAR WITH RESTRICTED HEARING OF LEFT EAR: Primary | ICD-10-CM

## 2024-04-18 PROCEDURE — 99213 OFFICE O/P EST LOW 20 MIN: CPT | Performed by: STUDENT IN AN ORGANIZED HEALTH CARE EDUCATION/TRAINING PROGRAM

## 2024-04-18 PROCEDURE — 1159F MED LIST DOCD IN RCRD: CPT | Performed by: STUDENT IN AN ORGANIZED HEALTH CARE EDUCATION/TRAINING PROGRAM

## 2024-04-18 PROCEDURE — 1126F AMNT PAIN NOTED NONE PRSNT: CPT | Performed by: STUDENT IN AN ORGANIZED HEALTH CARE EDUCATION/TRAINING PROGRAM

## 2024-04-18 PROCEDURE — 92550 TYMPANOMETRY & REFLEX THRESH: CPT | Performed by: AUDIOLOGIST

## 2024-04-18 PROCEDURE — 92557 COMPREHENSIVE HEARING TEST: CPT | Performed by: AUDIOLOGIST

## 2024-04-18 PROCEDURE — 1160F RVW MEDS BY RX/DR IN RCRD: CPT | Performed by: STUDENT IN AN ORGANIZED HEALTH CARE EDUCATION/TRAINING PROGRAM

## 2024-04-18 PROCEDURE — 3078F DIAST BP <80 MM HG: CPT | Performed by: STUDENT IN AN ORGANIZED HEALTH CARE EDUCATION/TRAINING PROGRAM

## 2024-04-18 PROCEDURE — 1036F TOBACCO NON-USER: CPT | Performed by: STUDENT IN AN ORGANIZED HEALTH CARE EDUCATION/TRAINING PROGRAM

## 2024-04-18 PROCEDURE — 1157F ADVNC CARE PLAN IN RCRD: CPT | Performed by: STUDENT IN AN ORGANIZED HEALTH CARE EDUCATION/TRAINING PROGRAM

## 2024-04-18 PROCEDURE — 3074F SYST BP LT 130 MM HG: CPT | Performed by: STUDENT IN AN ORGANIZED HEALTH CARE EDUCATION/TRAINING PROGRAM

## 2024-04-18 ASSESSMENT — ENCOUNTER SYMPTOMS
DEPRESSION: 0
OCCASIONAL FEELINGS OF UNSTEADINESS: 0

## 2024-04-18 ASSESSMENT — PAIN SCALES - GENERAL: PAINLEVEL: 0-NO PAIN

## 2024-04-19 NOTE — PROGRESS NOTES
CHIEF COMPLAINT:   Chief Complaint   Patient presents with    Follow-up     Hearing loss       HISTORY OF PRESENT ILLNESS from 1/18/2024: Jose Gamez is a 78 y.o. male who presents today with symptoms of left and right ear hearing loss.  He has a history of right sided fluctuating hearing loss and has been previously seen by Dr. 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  Dr. Argueta obtained an MRI IAC, which did not demonstrate retrocochlear pathology.  He presents today to discuss his left ear.  Around Thanksgiving, he had an incident with diving, and since that time, he felt that his left ear has been muffled and with fullness in the left ear.  He has tinnitus in both ears.  He denies prior ear surgery.       Interval History: He feels that his hearing is stable and is actually somewhat improved from when he saw me last.      PAST MEDICAL HISTORY:   Past Medical History:   Diagnosis Date    Abnormal electrocardiogram (ECG) (EKG)     Abnormal EKG    Hyperlipidemia, unspecified 11/03/2013    Hyperlipidemia    Malignant neoplasm of colon, unspecified (Multi) 03/23/2021    Colon cancer    Other abnormal glucose 01/12/2016    Blood glucose abnormal    Other specified postprocedural states     H/O colonoscopy    Personal history of other diseases of male genital organs     History of erectile dysfunction    Personal history of other diseases of the musculoskeletal system and connective tissue     Personal history of arthritis    Personal history of other diseases of the nervous system and sense organs     History of cataract    Personal history of other diseases of the nervous system and sense organs     History of migraine    Personal history of other malignant neoplasm of large intestine     History of malignant neoplasm of colon    Personal history of other malignant neoplasm of skin     History of basal cell carcinoma (BCC)    Polyp of colon     Benign colon polyp       PAST SURGICAL  "HISTORY:   Past Surgical History:   Procedure Laterality Date    OTHER SURGICAL HISTORY  12/18/2013    Surgery Of The Eyelids    OTHER SURGICAL HISTORY  07/30/2019    Excision of basal cell carcinoma    OTHER SURGICAL HISTORY  07/30/2019    Cataract surgery    OTHER SURGICAL HISTORY  07/30/2019    Vasectomy    TONSILLECTOMY  12/18/2013    Tonsillectomy    VASECTOMY  12/18/2013    Surgery Vas Deferens Vasectomy       MEDICATIONS:   Current Outpatient Medications:     amLODIPine (Norvasc) 10 mg tablet, Take 1 tablet (10 mg) by mouth once daily., Disp: , Rfl:     aspirin 81 mg EC tablet, Take 1 tablet (81 mg) by mouth once daily., Disp: , Rfl:     atorvastatin (Lipitor) 20 mg tablet, Take 1 tablet (20 mg) by mouth once daily at bedtime., Disp: , Rfl:     biotin 1 mg capsule, Take 1 capsule (1 mg) by mouth once daily., Disp: , Rfl:     butalbital-acetaminophen-caff -40 mg tablet, Take 1 tablet by mouth every 8 hours., Disp: , Rfl:     lisinopril 40 mg tablet, Take 1 tablet (40 mg) by mouth once daily., Disp: 90 tablet, Rfl: 3    multivit-min-FA-lycopen-lutein (Centrum Silver Ultra Men's) 300-600-300 mcg tablet, Take 1 tablet by mouth 1 (one) time each day., Disp: , Rfl:     ALLERGIES:   Allergies   Allergen Reactions    Animal Dander Unknown    Bee Pollen Unknown    Grass Pollen Unknown       SOCIAL HISTORY:   reports that he has never smoked. He has never used smokeless tobacco. He reports current alcohol use of about 14.0 standard drinks of alcohol per week. He reports that he does not use drugs.    FAMILY HISTORY: family history includes Arthritis in his mother; Asthma in his father; COPD in his mother; Fibromyalgia in his mother.    PHYSICAL EXAM:    VITALS:   Vitals:    04/18/24 1025   BP: 116/68   Pulse: 55   Resp: 16   Temp: 36.8 °C (98.3 °F)   SpO2: 99%   Weight: 78.8 kg (173 lb 12.8 oz)   Height: 1.753 m (5' 9\")        GENERAL: healthy, alert, well developed, well nourished, no distress, cooperative, " appears chronologic age    Communicates with normal voice and without hearing aids.    HEAD: atraumatic, normocephalic, no lesions    EYES: normal, PERRLA and EOM's intact    EARS:   Right ear demonstrates a patent external auditory canal with a normal tympanic membrane.    Left ear: demonstrates a patent external auditory canal with an intact tympanic membrane with myringosclerosis.  He is able to auto insufflate.   Castellanos tuning fork test lateralizes midline 512 Hz.   Rinne testing with a 512 Hz tuning fork: Right Air conduction > Bone conduction   Left Air conduction > Bone conduction      NOSE: nose shows no deformity, asymmetry, or inflammation, nasal mucosa normal.    ORAL CAVITY/OROPHARYNX: negative findings: lips normal without lesions, buccal mucosa normal, gums healthy, teeth intact, non-carious, palate normal, tongue midline and normal, soft palate, uvula, and tonsils normal    NECK AND SALIVARY GLANDS: Neck is supple without masses or lymphadenopathy. Palpation of the parotid and submandibular gland reveals no masses or tenderness to palpation.    CRANIAL NERVES: intact,   Facial nerve exam  is House - Brackmann 1- Normal Function on the right and 1- Normal Function on the left.    CARDIOVASCULAR: radial pulse is palpable and regular, no evidence of peripheral edema    PULMONARY: normal lung excursion, no evidence of retractions    DATA REVIEWED:  Audiogram  I reviewed the audiogram from 1/18/2024, which demonstrated left normal sloping to moderate sensorineural hearing loss and right mild rising to near normal and sloping to moderate sensorineural hearing loss.  WRS was 76% on the right and 84% on the left.  After reviewing multiple previous audiograms, there is significant fluctuation, particularly on the right side, but mostly stable hearing in both the left and right side.     I reviewed the audiogram from 4/18/2024, which shows stable thresholds in the left and the right, but there is significant  improvement in his word recognition score on both sides.  His word recognition score is now 92% on both sides.    MRI scan  I reviewed both the report and images from the MRI from 1/16/2023, which did not demonstrate retrocochlear pathology    IMPRESSION:   1) Bilateral fluctuating hearing loss, right worse than left    PLAN:  I discussed that I think he most likely has cochlear hydrops.  His hearing appears to be roughly stable.  He has been trying to be consistent with a low-sodium diet.  I discussed that if he has sudden hearing change, he should call and we will potentially start steroids or steroid injection.  I also discussed that he should follow-up in 1 year with an audiogram to see if his hearing is stable.    Rajendra Uriarte MD

## 2024-04-24 ENCOUNTER — ALLIED HEALTH (OUTPATIENT)
Dept: INTEGRATIVE MEDICINE | Facility: CLINIC | Age: 79
End: 2024-04-24
Payer: MEDICARE

## 2024-04-24 DIAGNOSIS — M99.03 SEGMENTAL AND SOMATIC DYSFUNCTION OF LUMBAR REGION: ICD-10-CM

## 2024-04-24 DIAGNOSIS — M53.3 SACRAL BACK PAIN: ICD-10-CM

## 2024-04-24 DIAGNOSIS — M99.05 SEGMENTAL AND SOMATIC DYSFUNCTION OF PELVIC REGION: ICD-10-CM

## 2024-04-24 DIAGNOSIS — M99.02 SEGMENTAL AND SOMATIC DYSFUNCTION OF THORACIC REGION: ICD-10-CM

## 2024-04-24 DIAGNOSIS — M54.50 MIDLINE LOW BACK PAIN WITHOUT SCIATICA, UNSPECIFIED CHRONICITY: Primary | ICD-10-CM

## 2024-04-24 DIAGNOSIS — M99.04 SEGMENTAL AND SOMATIC DYSFUNCTION OF SACRAL REGION: ICD-10-CM

## 2024-04-24 DIAGNOSIS — M47.817 SPONDYLOSIS OF LUMBOSACRAL REGION WITHOUT MYELOPATHY OR RADICULOPATHY: ICD-10-CM

## 2024-04-24 PROCEDURE — 98941 CHIROPRACT MANJ 3-4 REGIONS: CPT | Performed by: CHIROPRACTOR

## 2024-04-24 ASSESSMENT — ENCOUNTER SYMPTOMS
SHORTNESS OF BREATH: 0
FATIGUE: 0
CONFUSION: 0
VOMITING: 0
FEVER: 0
DIZZINESS: 0
NAUSEA: 0
WOUND: 0
APPETITE CHANGE: 0
WEAKNESS: 0
DYSURIA: 0

## 2024-04-24 NOTE — PROGRESS NOTES
Subjective   Patient ID: Jose Gamez is a 78 y.o. male who presents today for the treatment of their R lower back and hip pain.    This is visit 3 of the calendar year. Medicare.    Fall risk: None. No falls in the last 6 months.     HPI -he is happy to report that the treatments in addition to the extension stretching have been significantly helpful.  He continues to have a lot of cramping and tightness in the bilateral hamstrings.  He has a couple trips coming up and is hoping the treatment will help with his stamina and ability to travel and walk with more ease and comfort.    Last treatment 3/11/24: he is feeling much better after our last treatment.  He has been enjoying the sphinx pose and he has found that the extension stretching is helpful between treatment sessions.  We will continue with the same protocol as it has proven beneficial.    REEXAM 3/26/24: It has been approximately 2 years since Jose's last chiropractic appointment here at [facility].  He did recently see his primary care physician, Perry Samaniego, for his Medicare annual wellness visit.  Overall he is doing well but has had some recurrence of his lower back pain issues on the right side which were successfully treated in the past with chiropractic manipulative therapies.  For this reason he was encouraged to follow-up here for additional treatment and management of his pain.    Pain at the R greater trochanter, R> L high hamstring region and lumbosacral spine.  Pain is more pronounced first thing in the morning and he is extremely stiff for quite some time in the morning until things start to loosen up.  He also reports that he is unable to walk and be as active as he would like due to the pain and decreased stamina.  He has a treadmill and bike at home but just cannot seem to get motivated to utilize them.  However, he does have a big river boat cruise trip with his brother in June and would like to have more stamina  and be able to explore more easily without being nervous of fatigue.    He has continued with some of his home exercises including a lunge stretch which was given to him several years ago.  This does help but he also feels that the manual therapy provided additional/measurable improvement in management of his pain.  There is no numbness or tingling into the legs or feet.  Occasionally he will have charley horses.  He does find it difficult to find comfortable positions to sleep and due to the right hip/bursitis pain.  He finds that he has to lay on his back which is not his preferred position to sleep in.    Patient describes the pain as achiness, stiffness, and dull, and rates the current pain 1 out of 10 (10 being the worst). Up to 6/10   ____  INITIAL INTAKE/SUBJECTIVE 06/29/2022: He was referred here by his primary care physician after having his annual wellness exam in March. At this time Jose was reporting new ongoing lower back stiffness which started in the spring 2021. The pain continues over a year later and has started to affect his ability to walk and exercise as he would like. Pain is described as mild to moderate at times and is felt daily. He does explain that the pain and stiffness has gotten better since when he made the appointment. The hamstrings are not as stiff as they were. He also explains that when trying to walk and stand if he put his knuckles into the lower back it seemed to alleviate some of the tension and pain.     There is no specific trigger or mechanism of injury that he can recall. Pain is not sharp but more of an intense stiffness at the lumbosacral region that gets progressively worse, then the bilateral hamstrings will get really tight. He does not report any numbness tingling or weakness. No bowel or bladder issues related or worse since the onset of this pain.     Past medical history: Jose does have history of an enlarged thoracic aorta, high blood pressure, high  cholesterol, high glucose, and reoccurring skin cancers and colon cancer.   ____  Review of Systems   Constitutional:  Negative for appetite change, fatigue and fever.   HENT:  Negative for congestion and hearing loss.    Eyes:  Negative for visual disturbance.   Respiratory:  Negative for shortness of breath.    Cardiovascular:  Negative for chest pain.        Hypertension, treated with medications by PCP; he does follow-up with cardiology for coronary artery disease.  Recent tests have been stable.   Gastrointestinal:  Negative for nausea and vomiting.        Morning urinary weakness.  Being followed by his PCP.   Genitourinary:  Negative for dysuria.   Musculoskeletal:         Past left knee pain treated with injections by orthopedic.   Skin:  Negative for rash and wound.   Neurological:  Negative for dizziness and weakness.   Hematological:         High cholesterol, treated with medications from PCP; elevated blood glucose is being monitored   Psychiatric/Behavioral:  Negative for confusion.    All other systems reviewed and are negative.    Right hip and pelvis x-rays 3/18/2024: Findings include mild degenerative changes with no acute osseous abnormality.  Results were sent to the patient by Dr. Brock 3/23/2024.    Objective   Observation : hyperkyphosis    Physical ExamExamination findings (palpation & ROM): Tenderness, hypertonicity and trigger points palpated throughout the right greater than left iliolumbar region, right greater than left SI joint, right greater than left gluteal musculature.  Additional right greater than left hip flexor tightness.  There is a thoracic kyphosis but this is mild.    Segmental joint dysfunction was identified in the following areas using motion palpation and/or pain provocation assessment:  Thoracic: T7, T8 and T9.   Lumbar: L5.   Sacrum: L5/S1, right SI and left SI.     Today's treatment:  Performed spinal manipulation to the regions of segmental dysfunction identified on  examination using age-appropriate and injury specific force, and manual diversified technique. Technique Used: Gentle prone joint mobilization in the thoracic spine and pelvic drop table technique.       Manual Therapy (08633), Prone MFR and IC to the lumbosacral region and into the BL L/S region, BL SIJs and BL hamstrings. Prone hip flexor stretching.      Continue sphinx pose to encourage gentle L/S extension. Standing hamstring stretch with stair.       Treatment Plan:   The patient and I discussed the risks and benefits of chiropractic care. Based on the patient's subjective complaints along with the examination findings, it is advised that a course of chiropractic treatment by initiated. Consent for care was given both written and orally by the patient. The patient tolerated today's treatment with little or no additional discomfort and was instructed to contact the office for questions or concerns.     Treatment Frequency: Will see/treat patient every 2-4 weeks as therapeutic benefit is sustained, then frequency will be reduced to as needed for symptom management or as needed for acute flare-ups/exacerbation.     Please note: Voice-to-text software was used when completing this note.  While the note was proofread, portions may include grammatical errors.  Please contact me with any questions/concerns as it relates to these types of errors.

## 2024-05-01 DIAGNOSIS — I10 PRIMARY HYPERTENSION: Primary | ICD-10-CM

## 2024-05-01 RX ORDER — AMLODIPINE BESYLATE 10 MG/1
10 TABLET ORAL DAILY
Qty: 90 TABLET | Refills: 3 | Status: SHIPPED | OUTPATIENT
Start: 2024-05-01

## 2024-05-08 ENCOUNTER — ALLIED HEALTH (OUTPATIENT)
Dept: INTEGRATIVE MEDICINE | Facility: CLINIC | Age: 79
End: 2024-05-08
Payer: MEDICARE

## 2024-05-08 DIAGNOSIS — M54.50 MIDLINE LOW BACK PAIN WITHOUT SCIATICA, UNSPECIFIED CHRONICITY: Primary | ICD-10-CM

## 2024-05-08 DIAGNOSIS — M99.03 SEGMENTAL AND SOMATIC DYSFUNCTION OF LUMBAR REGION: ICD-10-CM

## 2024-05-08 DIAGNOSIS — M47.817 SPONDYLOSIS OF LUMBOSACRAL REGION WITHOUT MYELOPATHY OR RADICULOPATHY: ICD-10-CM

## 2024-05-08 DIAGNOSIS — M99.04 SEGMENTAL AND SOMATIC DYSFUNCTION OF SACRAL REGION: ICD-10-CM

## 2024-05-08 DIAGNOSIS — M53.3 SACRAL BACK PAIN: ICD-10-CM

## 2024-05-08 DIAGNOSIS — M99.02 SEGMENTAL AND SOMATIC DYSFUNCTION OF THORACIC REGION: ICD-10-CM

## 2024-05-08 DIAGNOSIS — M99.05 SEGMENTAL AND SOMATIC DYSFUNCTION OF PELVIC REGION: ICD-10-CM

## 2024-05-08 PROCEDURE — 98941 CHIROPRACT MANJ 3-4 REGIONS: CPT | Performed by: CHIROPRACTOR

## 2024-05-08 ASSESSMENT — ENCOUNTER SYMPTOMS
DIZZINESS: 0
NAUSEA: 0
FATIGUE: 0
WOUND: 0
SHORTNESS OF BREATH: 0
VOMITING: 0
DYSURIA: 0
APPETITE CHANGE: 0
FEVER: 0
CONFUSION: 0
WEAKNESS: 0

## 2024-05-08 NOTE — PROGRESS NOTES
Subjective   Patient ID: Jose Gamez is a 78 y.o. male who presents today for the treatment of their R lower back and hip pain.    This is visit 4 of the calendar year. Medicare.    Fall risk: None. No falls in the last 6 months.     HPI -he was able to go shopping with his wife and have enough stamina to do over 7000 steps.  This makes him feel more confident about his upcoming trips.  They are leaving next week for a week, then he will be able to come in for 1 follow-up treatment before they leave for their second trip.  He is heading home today to do some yard work And will also test his stamina with this.  We discussed how he should pace himself doing only 20 to 30 minutes at a time and taking breaks to hydrate and stretch.    Last treatment 4/24/24: he is happy to report that the treatments in addition to the extension stretching have been significantly helpful.  He continues to have a lot of cramping and tightness in the bilateral hamstrings.  He has a couple trips coming up and is hoping the treatment will help with his stamina and ability to travel and walk with more ease and comfort.    3/11/24: he is feeling much better after our last treatment.  He has been enjoying the sphinx pose and he has found that the extension stretching is helpful between treatment sessions.  We will continue with the same protocol as it has proven beneficial.    REEXAM 3/26/24: It has been approximately 2 years since Jose's last chiropractic appointment here at [facility].  He did recently see his primary care physician, Perry Samaniego, for his Medicare annual wellness visit.  Overall he is doing well but has had some recurrence of his lower back pain issues on the right side which were successfully treated in the past with chiropractic manipulative therapies.  For this reason he was encouraged to follow-up here for additional treatment and management of his pain.    Pain at the R greater trochanter, R> L  high hamstring region and lumbosacral spine.  Pain is more pronounced first thing in the morning and he is extremely stiff for quite some time in the morning until things start to loosen up.  He also reports that he is unable to walk and be as active as he would like due to the pain and decreased stamina.  He has a treadmill and bike at home but just cannot seem to get motivated to utilize them.  However, he does have a big river boat cruise trip with his brother in June and would like to have more stamina and be able to explore more easily without being nervous of fatigue.    He has continued with some of his home exercises including a lunge stretch which was given to him several years ago.  This does help but he also feels that the manual therapy provided additional/measurable improvement in management of his pain.  There is no numbness or tingling into the legs or feet.  Occasionally he will have charley horses.  He does find it difficult to find comfortable positions to sleep and due to the right hip/bursitis pain.  He finds that he has to lay on his back which is not his preferred position to sleep in.    Patient describes the pain as achiness, stiffness, and dull, and rates the current pain 1 out of 10 (10 being the worst). Up to 6/10   ____  INITIAL INTAKE/SUBJECTIVE 06/29/2022: He was referred here by his primary care physician after having his annual wellness exam in March. At this time Jose was reporting new ongoing lower back stiffness which started in the spring 2021. The pain continues over a year later and has started to affect his ability to walk and exercise as he would like. Pain is described as mild to moderate at times and is felt daily. He does explain that the pain and stiffness has gotten better since when he made the appointment. The hamstrings are not as stiff as they were. He also explains that when trying to walk and stand if he put his knuckles into the lower back it seemed to alleviate  some of the tension and pain.     There is no specific trigger or mechanism of injury that he can recall. Pain is not sharp but more of an intense stiffness at the lumbosacral region that gets progressively worse, then the bilateral hamstrings will get really tight. He does not report any numbness tingling or weakness. No bowel or bladder issues related or worse since the onset of this pain.     Past medical history: Jose does have history of an enlarged thoracic aorta, high blood pressure, high cholesterol, high glucose, and reoccurring skin cancers and colon cancer.   ____  Review of Systems   Constitutional:  Negative for appetite change, fatigue and fever.   HENT:  Negative for congestion and hearing loss.    Eyes:  Negative for visual disturbance.   Respiratory:  Negative for shortness of breath.    Cardiovascular:  Negative for chest pain.        Hypertension, treated with medications by PCP; he does follow-up with cardiology for coronary artery disease.  Recent tests have been stable.   Gastrointestinal:  Negative for nausea and vomiting.        Morning urinary weakness.  Being followed by his PCP.   Genitourinary:  Negative for dysuria.   Musculoskeletal:         Past left knee pain treated with injections by orthopedic.   Skin:  Negative for rash and wound.   Neurological:  Negative for dizziness and weakness.   Hematological:         High cholesterol, treated with medications from PCP; elevated blood glucose is being monitored   Psychiatric/Behavioral:  Negative for confusion.    All other systems reviewed and are negative.    Right hip and pelvis x-rays 3/18/2024: Findings include mild degenerative changes with no acute osseous abnormality.  Results were sent to the patient by Dr. Brock 3/23/2024.    Objective   Observation : hyperkyphosis    Physical ExamExamination findings (palpation & ROM): Tenderness, hypertonicity and trigger points palpated throughout the right greater than left iliolumbar  region, right greater than left SI joint, right greater than left gluteal musculature.  Additional right greater than left hip flexor tightness.  There is a thoracic kyphosis but this is mild.    Segmental joint dysfunction was identified in the following areas using motion palpation and/or pain provocation assessment:  Thoracic: T7, T8 and T9.   Lumbar: L5.   Sacrum: L5/S1, right SI and left SI.     Today's treatment:  Performed spinal manipulation to the regions of segmental dysfunction identified on examination using age-appropriate and injury specific force, and manual diversified technique. Technique Used: Gentle prone joint mobilization in the thoracic spine and pelvic drop table technique.       Manual Therapy (08839), Prone MFR and IC to the lumbosacral region and into the BL L/S region, BL SIJs and BL hamstrings. Prone hip flexor stretching.  Cryoderm applied afterward.     Continue sphinx pose to encourage gentle L/S extension. Standing hamstring stretch with stair.       Treatment Plan:   The patient and I discussed the risks and benefits of chiropractic care. Based on the patient's subjective complaints along with the examination findings, it is advised that a course of chiropractic treatment by initiated. Consent for care was given both written and orally by the patient. The patient tolerated today's treatment with little or no additional discomfort and was instructed to contact the office for questions or concerns.     Treatment Frequency: Will see/treat patient every 2-4 weeks as therapeutic benefit is sustained, then frequency will be reduced to as needed for symptom management or as needed for acute flare-ups/exacerbation.     Please note: Voice-to-text software was used when completing this note.  While the note was proofread, portions may include grammatical errors.  Please contact me with any questions/concerns as it relates to these types of errors.

## 2024-05-21 ENCOUNTER — ALLIED HEALTH (OUTPATIENT)
Dept: INTEGRATIVE MEDICINE | Facility: CLINIC | Age: 79
End: 2024-05-21
Payer: MEDICARE

## 2024-05-21 DIAGNOSIS — M53.3 SACRAL BACK PAIN: ICD-10-CM

## 2024-05-21 DIAGNOSIS — M99.02 SEGMENTAL AND SOMATIC DYSFUNCTION OF THORACIC REGION: ICD-10-CM

## 2024-05-21 DIAGNOSIS — M54.50 MIDLINE LOW BACK PAIN WITHOUT SCIATICA, UNSPECIFIED CHRONICITY: Primary | ICD-10-CM

## 2024-05-21 DIAGNOSIS — M99.04 SEGMENTAL AND SOMATIC DYSFUNCTION OF SACRAL REGION: ICD-10-CM

## 2024-05-21 DIAGNOSIS — M47.817 SPONDYLOSIS OF LUMBOSACRAL REGION WITHOUT MYELOPATHY OR RADICULOPATHY: ICD-10-CM

## 2024-05-21 DIAGNOSIS — M99.05 SEGMENTAL AND SOMATIC DYSFUNCTION OF PELVIC REGION: ICD-10-CM

## 2024-05-21 DIAGNOSIS — M99.03 SEGMENTAL AND SOMATIC DYSFUNCTION OF LUMBAR REGION: ICD-10-CM

## 2024-05-21 PROCEDURE — 98941 CHIROPRACT MANJ 3-4 REGIONS: CPT | Performed by: CHIROPRACTOR

## 2024-05-21 ASSESSMENT — ENCOUNTER SYMPTOMS
DYSURIA: 0
NAUSEA: 0
APPETITE CHANGE: 0
CONFUSION: 0
DIZZINESS: 0
WEAKNESS: 0
VOMITING: 0
WOUND: 0
FATIGUE: 0
SHORTNESS OF BREATH: 0
FEVER: 0

## 2024-05-21 NOTE — PROGRESS NOTES
Subjective   Patient ID: Jose Gamez is a 78 y.o. male who presents today for the treatment of their R lower back and hip pain.    This is visit 5 of the calendar year. Medicare.    Fall risk: None. No falls in the last 6 months.     HPI -he had a successful trip and was even able to hike a little bit with his friends without any significant discomfort in his back.  He mentions that downhill was definitely easier than going uphill.  All in all he is pleased with his fitness and ability to keep with the group.    In about a week and a half he is leaving for his trip to [place] and is feeling fairly confident.  He will make an appointment after his trip if he feels that is necessary.  He believes that he has reached his goals for this current treatment program/protocol.    Last treatment 5/8/24: he was able to go shopping with his wife and have enough stamina to do over 7000 steps.  This makes him feel more confident about his upcoming trips.  They are leaving next week for a week, then he will be able to come in for 1 follow-up treatment before they leave for their second trip.  He is heading home today to do some yard work and will also test his stamina with this.  We discussed how he should pace himself doing only 20 to 30 minutes at a time and taking breaks to hydrate and stretch.    4/24/24: he is happy to report that the treatments in addition to the extension stretching have been significantly helpful.  He continues to have a lot of cramping and tightness in the bilateral hamstrings.  He has a couple trips coming up and is hoping the treatment will help with his stamina and ability to travel and walk with more ease and comfort.    3/11/24: he is feeling much better after our last treatment.  He has been enjoying the sphinx pose and he has found that the extension stretching is helpful between treatment sessions.  We will continue with the same protocol as it has proven beneficial.    REEXAM 3/26/24:  It has been approximately 2 years since Jose's last chiropractic appointment here at [facility].  He did recently see his primary care physician, Perry Samaniego, for his Medicare annual wellness visit.  Overall he is doing well but has had some recurrence of his lower back pain issues on the right side which were successfully treated in the past with chiropractic manipulative therapies.  For this reason he was encouraged to follow-up here for additional treatment and management of his pain.    Pain at the R greater trochanter, R> L high hamstring region and lumbosacral spine.  Pain is more pronounced first thing in the morning and he is extremely stiff for quite some time in the morning until things start to loosen up.  He also reports that he is unable to walk and be as active as he would like due to the pain and decreased stamina.  He has a treadmill and bike at home but just cannot seem to get motivated to utilize them.  However, he does have a big river boat cruise trip with his brother in June and would like to have more stamina and be able to explore more easily without being nervous of fatigue.    He has continued with some of his home exercises including a lunge stretch which was given to him several years ago.  This does help but he also feels that the manual therapy provided additional/measurable improvement in management of his pain.  There is no numbness or tingling into the legs or feet.  Occasionally he will have charley horses.  He does find it difficult to find comfortable positions to sleep and due to the right hip/bursitis pain.  He finds that he has to lay on his back which is not his preferred position to sleep in.    Patient describes the pain as achiness, stiffness, and dull, and rates the current pain 1 out of 10 (10 being the worst). Up to 6/10   ____  INITIAL INTAKE/SUBJECTIVE 06/29/2022: He was referred here by his primary care physician after having his annual wellness exam in  March. At this time Jose was reporting new ongoing lower back stiffness which started in the spring 2021. The pain continues over a year later and has started to affect his ability to walk and exercise as he would like. Pain is described as mild to moderate at times and is felt daily. He does explain that the pain and stiffness has gotten better since when he made the appointment. The hamstrings are not as stiff as they were. He also explains that when trying to walk and stand if he put his knuckles into the lower back it seemed to alleviate some of the tension and pain.     There is no specific trigger or mechanism of injury that he can recall. Pain is not sharp but more of an intense stiffness at the lumbosacral region that gets progressively worse, then the bilateral hamstrings will get really tight. He does not report any numbness tingling or weakness. No bowel or bladder issues related or worse since the onset of this pain.     Past medical history: Jose does have history of an enlarged thoracic aorta, high blood pressure, high cholesterol, high glucose, and reoccurring skin cancers and colon cancer.   ____  Review of Systems   Constitutional:  Negative for appetite change, fatigue and fever.   HENT:  Negative for congestion and hearing loss.    Eyes:  Negative for visual disturbance.   Respiratory:  Negative for shortness of breath.    Cardiovascular:  Negative for chest pain.        Hypertension, treated with medications by PCP; he does follow-up with cardiology for coronary artery disease.  Recent tests have been stable.   Gastrointestinal:  Negative for nausea and vomiting.        Morning urinary weakness.  Being followed by his PCP.   Genitourinary:  Negative for dysuria.   Musculoskeletal:         Past left knee pain treated with injections by orthopedic.   Skin:  Negative for rash and wound.   Neurological:  Negative for dizziness and weakness.   Hematological:         High cholesterol, treated  with medications from PCP; elevated blood glucose is being monitored   Psychiatric/Behavioral:  Negative for confusion.    All other systems reviewed and are negative.    Right hip and pelvis x-rays 3/18/2024: Findings include mild degenerative changes with no acute osseous abnormality.  Results were sent to the patient by Dr. Brock 3/23/2024.    Objective   Observation : hyperkyphosis    Physical Exam  Examination findings (palpation & ROM): Tenderness, hypertonicity and trigger points palpated throughout the right greater than left iliolumbar region, right greater than left SI joint, right greater than left gluteal musculature.  Additional right greater than left hip flexor tightness.  There is a thoracic kyphosis but this is mild.    Segmental joint dysfunction was identified in the following areas using motion palpation and/or pain provocation assessment:  Thoracic: T7, T8 and T9.   Lumbar: L5.   Sacrum: L5/S1, right SI and left SI.     Today's treatment:  Performed spinal manipulation to the regions of segmental dysfunction identified on examination using age-appropriate and injury specific force, and manual diversified technique. Technique Used: Gentle prone joint mobilization in the thoracic spine and pelvic drop table technique.       Manual Therapy ([id number]), Prone MFR and IC to the lumbosacral region and into the BL L/S region, BL SIJs and BL hamstrings. Prone hip flexor stretching.  Cryoderm applied afterward.     Continue sphinx pose to encourage gentle L/S extension. Standing hip flexor stretch and hamstring stretch with stair.       Treatment Plan:   The patient and I discussed the risks and benefits of chiropractic care. Based on the patient's subjective complaints along with the examination findings, it is advised that a course of chiropractic treatment by initiated. Consent for care was given both written and orally by the patient. The patient tolerated today's treatment with little or no additional  discomfort and was instructed to contact the office for questions or concerns.     Treatment Frequency: Will see/treat patient every 2-4 weeks as therapeutic benefit is sustained, then frequency will be reduced to as needed for symptom management or as needed for acute flare-ups/exacerbation.     Please note: Voice-to-text software was used when completing this note.  While the note was proofread, portions may include grammatical errors.  Please contact me with any questions/concerns as it relates to these types of errors.

## 2024-08-20 PROBLEM — M25.551 PAIN OF RIGHT HIP JOINT: Status: ACTIVE | Noted: 2024-03-18

## 2024-08-21 ENCOUNTER — OFFICE VISIT (OUTPATIENT)
Dept: CARDIOLOGY | Facility: CLINIC | Age: 79
End: 2024-08-21
Payer: MEDICARE

## 2024-08-21 ENCOUNTER — HOSPITAL ENCOUNTER (OUTPATIENT)
Dept: CARDIOLOGY | Facility: CLINIC | Age: 79
Discharge: HOME | End: 2024-08-21
Payer: MEDICARE

## 2024-08-21 VITALS
SYSTOLIC BLOOD PRESSURE: 125 MMHG | OXYGEN SATURATION: 98 % | WEIGHT: 172 LBS | DIASTOLIC BLOOD PRESSURE: 74 MMHG | BODY MASS INDEX: 24.62 KG/M2 | HEART RATE: 58 BPM | HEIGHT: 70 IN

## 2024-08-21 DIAGNOSIS — I25.10 ARTERIOSCLEROSIS OF CORONARY ARTERY: Primary | ICD-10-CM

## 2024-08-21 DIAGNOSIS — E78.00 HYPERCHOLESTEREMIA: ICD-10-CM

## 2024-08-21 DIAGNOSIS — I77.810 MILD ASCENDING AORTA DILATION (CMS-HCC): ICD-10-CM

## 2024-08-21 DIAGNOSIS — I77.89 ENLARGED THORACIC AORTA (CMS-HCC): ICD-10-CM

## 2024-08-21 DIAGNOSIS — I25.10 ARTERIOSCLEROSIS OF CORONARY ARTERY: ICD-10-CM

## 2024-08-21 DIAGNOSIS — I10 PRIMARY HYPERTENSION: ICD-10-CM

## 2024-08-21 DIAGNOSIS — R93.1 AGATSTON CAC SCORE, >400: ICD-10-CM

## 2024-08-21 LAB
AORTIC VALVE MEAN GRADIENT: 4.2 MMHG
AORTIC VALVE PEAK VELOCITY: 1.29 M/S
AV PEAK GRADIENT: 6.7 MMHG
AVA (PEAK VEL): 3.08 CM2
AVA (VTI): 3.16 CM2
EJECTION FRACTION APICAL 4 CHAMBER: 52.2
EJECTION FRACTION: 58 %
LEFT ATRIUM VOLUME AREA LENGTH INDEX BSA: 29.3 ML/M2
LEFT VENTRICLE INTERNAL DIMENSION DIASTOLE: 5.1 CM (ref 3.5–6)
LEFT VENTRICULAR OUTFLOW TRACT DIAMETER: 2.3 CM
MITRAL VALVE E/A RATIO: 0.77
RIGHT VENTRICLE FREE WALL PEAK S': 11 CM/S
RIGHT VENTRICLE PEAK SYSTOLIC PRESSURE: 14.8 MMHG
TRICUSPID ANNULAR PLANE SYSTOLIC EXCURSION: 3.5 CM

## 2024-08-21 PROCEDURE — 93306 TTE W/DOPPLER COMPLETE: CPT | Performed by: INTERNAL MEDICINE

## 2024-08-21 PROCEDURE — 1159F MED LIST DOCD IN RCRD: CPT | Performed by: INTERNAL MEDICINE

## 2024-08-21 PROCEDURE — 3074F SYST BP LT 130 MM HG: CPT | Performed by: INTERNAL MEDICINE

## 2024-08-21 PROCEDURE — 1126F AMNT PAIN NOTED NONE PRSNT: CPT | Performed by: INTERNAL MEDICINE

## 2024-08-21 PROCEDURE — 99214 OFFICE O/P EST MOD 30 MIN: CPT | Performed by: INTERNAL MEDICINE

## 2024-08-21 PROCEDURE — 1036F TOBACCO NON-USER: CPT | Performed by: INTERNAL MEDICINE

## 2024-08-21 PROCEDURE — 3078F DIAST BP <80 MM HG: CPT | Performed by: INTERNAL MEDICINE

## 2024-08-21 PROCEDURE — 93306 TTE W/DOPPLER COMPLETE: CPT

## 2024-08-21 PROCEDURE — 1157F ADVNC CARE PLAN IN RCRD: CPT | Performed by: INTERNAL MEDICINE

## 2024-08-21 PROCEDURE — 1160F RVW MEDS BY RX/DR IN RCRD: CPT | Performed by: INTERNAL MEDICINE

## 2024-08-21 RX ORDER — LISINOPRIL 40 MG/1
40 TABLET ORAL DAILY
Qty: 90 TABLET | Refills: 3 | Status: SHIPPED | OUTPATIENT
Start: 2024-08-21

## 2024-08-21 ASSESSMENT — COLUMBIA-SUICIDE SEVERITY RATING SCALE - C-SSRS
6. HAVE YOU EVER DONE ANYTHING, STARTED TO DO ANYTHING, OR PREPARED TO DO ANYTHING TO END YOUR LIFE?: NO
1. IN THE PAST MONTH, HAVE YOU WISHED YOU WERE DEAD OR WISHED YOU COULD GO TO SLEEP AND NOT WAKE UP?: NO
2. HAVE YOU ACTUALLY HAD ANY THOUGHTS OF KILLING YOURSELF?: NO

## 2024-08-21 ASSESSMENT — PAIN SCALES - GENERAL: PAINLEVEL: 0-NO PAIN

## 2024-08-21 NOTE — PATIENT INSTRUCTIONS
You were seen in the [facility] for your coronary artery disease with high calcium score.     Your September 2019 calcium score was 1054 which implies a high amount of atherosclerosis (hardening of the heart arteries). Based on your current risk factors (former smoking, cholesterol numbers), you had a 16% chance of having a heart attack in the next 10 years before we made changes.     I recommend that we do the following to reduce your heart attack risk:  1. Continue to take aspirin 81 mg a day for life. You can take enteric coated (EC) tablets    2. Continue to take your cholesterol medication, atorvastatin 20 mg a day. This medication has reduced your cholesterol numbers to goal (LDL, the bad cholesterol is down 50% from 132 prior to statin therapy to 63 in March 2023) and can help remove cholesterol plaque from the artery walls). Long term LDL goal < 70.    3. Moderate intensity exercise at least 3 times a week for 30-45 minutes a time. Exercise helps protect the heart and blood vessels. Leg stretching will help you with your hamstring stiffness to may activity easier to do.    4. Eat a heart healthy diet. Limit portions of red meat. Eat fresh fruits and vegetables instead of processed carbohydrates. Olive oil (1 tablespoon a day) as a source of omega-3 fat. The Mediterranean diet has been shown in clinical trials to reduce risk of heart disease by following these principles.     Your echocardiogram today is the same as in August 2023 and August showing a stable aorta size of 4.1 cm. I recommend that we repeat this test to measure the size of your thoracic aorta again in 2 years since the size of your aorta has been unchanged over the last 3 years.      Your blood pressure has been at your long term goal range of 120-130 mm Hg with home readings 110s-120s mm Hg. Continue amlodipine 10 mg a day and lisinopril 40 mg a day.      Continue to check your blood pressure and keep a log book as  you have been doing. Sit down and rest for 3-5 minutes and then place your arm on the table to keep it level with your heart for best measurements.     Follow up with Dr. Contreras in 6 months.

## 2024-09-19 ENCOUNTER — APPOINTMENT (OUTPATIENT)
Dept: PRIMARY CARE | Facility: CLINIC | Age: 79
End: 2024-09-19
Payer: MEDICARE

## 2024-09-19 VITALS
HEIGHT: 69 IN | SYSTOLIC BLOOD PRESSURE: 120 MMHG | DIASTOLIC BLOOD PRESSURE: 70 MMHG | RESPIRATION RATE: 16 BRPM | WEIGHT: 172 LBS | HEART RATE: 61 BPM | TEMPERATURE: 97.9 F | OXYGEN SATURATION: 99 % | BODY MASS INDEX: 25.48 KG/M2

## 2024-09-19 DIAGNOSIS — I10 PRIMARY HYPERTENSION: ICD-10-CM

## 2024-09-19 DIAGNOSIS — R93.1 AGATSTON CAC SCORE, >400: ICD-10-CM

## 2024-09-19 DIAGNOSIS — G44.221 CHRONIC TENSION-TYPE HEADACHE, INTRACTABLE: Primary | ICD-10-CM

## 2024-09-19 PROCEDURE — 1170F FXNL STATUS ASSESSED: CPT | Performed by: INTERNAL MEDICINE

## 2024-09-19 PROCEDURE — 3078F DIAST BP <80 MM HG: CPT | Performed by: INTERNAL MEDICINE

## 2024-09-19 PROCEDURE — 1160F RVW MEDS BY RX/DR IN RCRD: CPT | Performed by: INTERNAL MEDICINE

## 2024-09-19 PROCEDURE — 1159F MED LIST DOCD IN RCRD: CPT | Performed by: INTERNAL MEDICINE

## 2024-09-19 PROCEDURE — 3074F SYST BP LT 130 MM HG: CPT | Performed by: INTERNAL MEDICINE

## 2024-09-19 PROCEDURE — 99214 OFFICE O/P EST MOD 30 MIN: CPT | Performed by: INTERNAL MEDICINE

## 2024-09-19 PROCEDURE — 1123F ACP DISCUSS/DSCN MKR DOCD: CPT | Performed by: INTERNAL MEDICINE

## 2024-09-19 PROCEDURE — 1157F ADVNC CARE PLAN IN RCRD: CPT | Performed by: INTERNAL MEDICINE

## 2024-09-19 PROCEDURE — G0008 ADMIN INFLUENZA VIRUS VAC: HCPCS | Performed by: INTERNAL MEDICINE

## 2024-09-19 PROCEDURE — 1036F TOBACCO NON-USER: CPT | Performed by: INTERNAL MEDICINE

## 2024-09-19 PROCEDURE — 1158F ADVNC CARE PLAN TLK DOCD: CPT | Performed by: INTERNAL MEDICINE

## 2024-09-19 PROCEDURE — 90662 IIV NO PRSV INCREASED AG IM: CPT | Performed by: INTERNAL MEDICINE

## 2024-09-19 RX ORDER — BUTALBITAL, ACETAMINOPHEN AND CAFFEINE 50; 325; 40 MG/1; MG/1; MG/1
1 TABLET ORAL EVERY 8 HOURS
Qty: 30 TABLET | Refills: 0 | Status: SHIPPED | OUTPATIENT
Start: 2024-09-19

## 2024-09-19 ASSESSMENT — PATIENT HEALTH QUESTIONNAIRE - PHQ9
1. LITTLE INTEREST OR PLEASURE IN DOING THINGS: NOT AT ALL
2. FEELING DOWN, DEPRESSED OR HOPELESS: NOT AT ALL
SUM OF ALL RESPONSES TO PHQ9 QUESTIONS 1 AND 2: 0

## 2024-09-19 ASSESSMENT — ACTIVITIES OF DAILY LIVING (ADL)
TAKING_MEDICATION: INDEPENDENT
GROCERY_SHOPPING: INDEPENDENT
MANAGING_FINANCES: INDEPENDENT
DRESSING: INDEPENDENT
BATHING: INDEPENDENT
DOING_HOUSEWORK: INDEPENDENT

## 2024-09-19 ASSESSMENT — ENCOUNTER SYMPTOMS
HEADACHES: 1
LIGHT-HEADEDNESS: 0
SLEEP DISTURBANCE: 0
DIZZINESS: 0
CONSTIPATION: 0
DIARRHEA: 0
ABDOMINAL PAIN: 0
BACK PAIN: 1

## 2024-09-19 NOTE — PROGRESS NOTES
Patient here for a medicare wellness visit and follow up    Subjective   Patient ID: Jose Gamez is a 79 y.o. male who presents for Follow-up and Medicare Annual Wellness Visit Subsequent.    The patient continues to follow with  from Cardiology every six months, and states that his condition is stable overall. He notes that his systolic blood pressure at home is often in the 110s mmHg.  The patient denies any dizziness or lightheadedness.    The patient reports four migraines this year, and states that symptoms are well controlled with Fioricet -40mg as needed for onset along with rest.      The patient mentions occasional back pain which is somewhat relieved with stretching exercises.  He continues to follow with  from [facility].    The patient wears a hearing aid device occasionally, and feels that this is working well.  He denies any vision changes.    The patient follows regularly with Dentistry, and reports good sleep quality.    The patient denies any abdominal pain or bowel problems.     The patient continues to follow regularly with Dermatology.      Review of Systems   HENT:  Positive for hearing loss.    Eyes:  Negative for visual disturbance.   Gastrointestinal:  Negative for abdominal pain, constipation and diarrhea.   Musculoskeletal:  Positive for back pain.   Neurological:  Positive for headaches. Negative for dizziness and light-headedness.   Psychiatric/Behavioral:  Negative for sleep disturbance.      Objective   Physical Exam  Constitutional:       Appearance: Normal appearance.   Neck:      Vascular: No carotid bruit.   Cardiovascular:      Rate and Rhythm: Normal rate and regular rhythm.      Heart sounds: Normal heart sounds.   Pulmonary:      Effort: Pulmonary effort is normal.      Breath sounds: Normal breath sounds.   Abdominal:      General: Bowel sounds are normal.      Palpations: Abdomen is soft.      Tenderness: There is no abdominal  tenderness.   Skin:     General: Skin is warm and dry.   Neurological:      General: No focal deficit present.      Mental Status: He is alert and oriented to person, place, and time. Mental status is at baseline.   Psychiatric:         Mood and Affect: Mood normal.         Behavior: Behavior normal.         Assessment/Plan   Problem List Items Addressed This Visit             ICD-10-CM    Agatston CAC score, >400 R93.1    Chronic tension headaches - Primary G44.229    Relevant Medications    butalbital-acetaminophen-caff -40 mg tablet    Hypertension I10       Medicare Wellness Examination Done  -  Discussed healthy diet and regular exercise.    -  Physical exam overall unremarkable. Immunizations reviewed and updated accordingly. Healthy lifestyle choices discussed (tobacco avoidance, appropriate alcohol use, avoidance of illicit substances).   -  Patient is wearing seatbelt.   -  Screening lab work ordered as indicated.    -  Age appropriate screening tests reviewed with patient.       IMPRESSION/PLAN:     HTN   - /70 in the office today.  Continue on lisinopril 40mg QD and amlodipine 10mg QD.      HLD   - Stable, continue on atorvastatin 20mg QD.  ASCVD 29.4% per 3/2024.     CAD   - Following with Dr. Contreras in Cardiology, takes ASA 81mg QD. Echocardiogram performed 8/2021 showed impaired relaxation patter of left ventricular diastolic filling and left atrium with mild-moderate dilation. Repeat in 8/2022 stable.     Elevated Glucose   - Last a1c 5.0% - 3/2024. Glucose elevated at 131 per 3/2024 CMP     Tension Headaches   - Refilled Fioricet -40mg as needed for onset of headache.     History of colon cancer  - Following w/ Dr. Thayer.  Last colonoscopy 12/2022, repeat due 12/2025 due to poor prep.    Back stiffness  - Advised patient to restart physical therapy exercises at home.  Following with  from [facility], as this was quite helpful in the past. Call the clinic if symptoms persist or  worsen.     Left Knee Pain/Swelling  - Follows with  from Orthopedic Surgery.       Health Maintenance   - Routine labs 3/2024. Last PSA wnl 3/2024.  Last colonoscopy 12/2022, repeat due 12/2025 due to poor prep.  Pneumonia, Shingrix, Tdap, and RSV UTD.  Patient received High Dose Influenza vaccine in the clinic today, tolerated well.      Follow-up in 6 months, call sooner if needed.       Scribe Attestation  By signing my name below, I, Darrion Florentino   attest that this documentation has been prepared under the direction and in the presence of Perry Samaniego DO.   Renetta Pettit 09/19/24 8:06 AM

## 2024-10-01 DIAGNOSIS — R93.1 AGATSTON CAC SCORE, >400: Primary | ICD-10-CM

## 2024-10-01 RX ORDER — ATORVASTATIN CALCIUM 20 MG/1
20 TABLET, FILM COATED ORAL NIGHTLY
Qty: 90 TABLET | Refills: 3 | Status: SHIPPED | OUTPATIENT
Start: 2024-10-01

## 2024-10-01 RX ORDER — ATORVASTATIN CALCIUM 20 MG/1
20 TABLET, FILM COATED ORAL NIGHTLY
Qty: 90 TABLET | Refills: 0 | OUTPATIENT
Start: 2024-10-01

## 2025-02-19 ENCOUNTER — OFFICE VISIT (OUTPATIENT)
Dept: CARDIOLOGY | Facility: CLINIC | Age: 80
End: 2025-02-19
Payer: MEDICARE

## 2025-02-19 VITALS
BODY MASS INDEX: 25.77 KG/M2 | WEIGHT: 174 LBS | HEIGHT: 69 IN | SYSTOLIC BLOOD PRESSURE: 121 MMHG | OXYGEN SATURATION: 96 % | DIASTOLIC BLOOD PRESSURE: 66 MMHG | HEART RATE: 58 BPM

## 2025-02-19 DIAGNOSIS — R93.1 AGATSTON CAC SCORE, >400: Primary | ICD-10-CM

## 2025-02-19 DIAGNOSIS — I25.10 ARTERIOSCLEROSIS OF CORONARY ARTERY: ICD-10-CM

## 2025-02-19 DIAGNOSIS — E78.00 HYPERCHOLESTEREMIA: ICD-10-CM

## 2025-02-19 DIAGNOSIS — I10 PRIMARY HYPERTENSION: ICD-10-CM

## 2025-02-19 DIAGNOSIS — I77.810 MILD ASCENDING AORTA DILATION (CMS-HCC): ICD-10-CM

## 2025-02-19 PROCEDURE — 1157F ADVNC CARE PLAN IN RCRD: CPT | Performed by: INTERNAL MEDICINE

## 2025-02-19 PROCEDURE — 99213 OFFICE O/P EST LOW 20 MIN: CPT | Performed by: INTERNAL MEDICINE

## 2025-02-19 PROCEDURE — G2211 COMPLEX E/M VISIT ADD ON: HCPCS | Performed by: INTERNAL MEDICINE

## 2025-02-19 PROCEDURE — 3078F DIAST BP <80 MM HG: CPT | Performed by: INTERNAL MEDICINE

## 2025-02-19 PROCEDURE — 1126F AMNT PAIN NOTED NONE PRSNT: CPT | Performed by: INTERNAL MEDICINE

## 2025-02-19 PROCEDURE — 93005 ELECTROCARDIOGRAM TRACING: CPT | Performed by: INTERNAL MEDICINE

## 2025-02-19 PROCEDURE — 3074F SYST BP LT 130 MM HG: CPT | Performed by: INTERNAL MEDICINE

## 2025-02-19 PROCEDURE — 1036F TOBACCO NON-USER: CPT | Performed by: INTERNAL MEDICINE

## 2025-02-19 PROCEDURE — 1159F MED LIST DOCD IN RCRD: CPT | Performed by: INTERNAL MEDICINE

## 2025-02-19 PROCEDURE — 93010 ELECTROCARDIOGRAM REPORT: CPT | Performed by: INTERNAL MEDICINE

## 2025-02-19 PROCEDURE — 1160F RVW MEDS BY RX/DR IN RCRD: CPT | Performed by: INTERNAL MEDICINE

## 2025-02-19 ASSESSMENT — ENCOUNTER SYMPTOMS
DEPRESSION: 0
LOSS OF SENSATION IN FEET: 0
OCCASIONAL FEELINGS OF UNSTEADINESS: 0

## 2025-02-19 ASSESSMENT — PAIN SCALES - GENERAL: PAINLEVEL_OUTOF10: 0-NO PAIN

## 2025-02-19 NOTE — PROGRESS NOTES
Subjective   Jose Gamez is a 79 y.o. male who presents to the [facility] for follow up of elevated CT calcium score and mild thoracic aorta enlargement. Last seen in August 2024.     Since our last visit, Mr. Gamez is stable without cardiac symptoms of active chest pain, dyspnea on exertion, PND, orthopnea, ERICK, palpitations, syncope, or claudication.     Prior to winter he was active outside and is now walking 3x/week on home treadmill for 30 minutes. Notes hamstring stiff has improved with PT program and less leg / back stiffness.      Ambulatory blood pressure recordings averaging 110s-120s/70s mm Hg on patient home BP log book. Home records reviewed in office today.      He had a 9/10/2019 CT calcium score that was very elevated at 1054. His 10 year FLORES risk score was 16% (intermediate-high risk) for MI prior to risk factor modification.      Past Medical History:  1. Coronary arteriosclerosis: 9/10/2019 CACS 1054 (LMCA 281, , LCx 0, ). FLORES 10 yr risk score 16%  2. Dyslipidemia: 3/7/2019 (no tx): ; TG 59; HDL 76; ; 1/6/2020 (atorva 20 mg): ; TG 47; HDL 79; LDL 67  3. Mild ascending aorta enlargement (4.1 cm 9/2019 CT, 2014 CT 4 cm). 8/11/2021 echo Asc Ao 4.1 cm; 8/4/2022 echo Asc Ao 4.1 cm.  4. h/o colon cancer     Social History:  Former smoker (quit in 1976, ~ 15 pack year history)     Family History:  No premature CAD in 1st degree relatives ( 55 years of age for male relatives, 65 years of age for female relatives)     Review of Systems    A 14 point review of systems was asked. All questions were negative except for pertinent positives listed in the HPI.      Objective   Physical Exam  BP Readings from Last 3 Encounters:   02/19/25 121/66   09/19/24 120/70   08/21/24 125/74      Wt Readings from Last 3 Encounters:   02/19/25 78.9 kg (174 lb)   09/19/24 78 kg (172 lb)   08/21/24 78 kg (172 lb)      BMI: Estimated body mass index  "is 25.7 kg/m² as calculated from the following:    Height as of this encounter: 1.753 m (5' 9\").    Weight as of this encounter: 78.9 kg (174 lb).  BSA: Estimated body surface area is 1.96 meters squared as calculated from the following:    Height as of this encounter: 1.753 m (5' 9\").    Weight as of this encounter: 78.9 kg (174 lb).    General: no acute distress  HEENT: EOMI, no scleral icterus.  Lungs: Clear to auscultation bilaterally without wheezing, rales, or rhonchi.  Cardiovascular: Regular rhythm and rate. Normal S1 and S2. No murmurs, rubs, or gallops are appreciated. JVP normal.  Abdomen: Soft, nontender, nondistended. Bowel sounds present.  Extremities: Warm and well perfused with equal 2+ pulses bilaterally.  No edema present.  Neurologic: Alert and oriented x3.    I have personally reviewed the following images and laboratory findings:  Last echocardiogram:  Most recent echo, 2024: LVEF 55-60%, LV conc remodeling (LVMI 90 gm/m2), impaired relaxation diastology (E/e' 7), normal LA size (NANETTE 29 ml/m2), normal RV, ULN RA (17.9 cm2), trivial AI, no AS, trace MR, trace TR, normal RVSP 15 mm Hg (RAP 3 mm Hg), Ao root 3.8 cm, Asc Ao 4.1 cm.    Prior echo, 2023: LVEF 55-60%, LV conc remodeling (LVMI 105 gm/m2), impaired relaxation diastology (E/e' 7), normal LA size (NANETTE 28 ml/m2), normal RV, mild MALIA (24 cm2), trivial AI, no AS, trace MR, trace TR, unable to estimate RVSP (prior  RVSP 26 mm Hg), RAP 3 mm Hg, Ao root 3.8 cm, Asc Ao 4.1 cm.    Last cath / stress test / CACS:  9/10/2019 CACS 1054 (LMCA 281, , LCx 0, ). FLORES 10 yr risk score 16%    Most recent EC2025 ECG: Sinus rhythm, 59 bpm, IVCD ( msec, not full LBBB pattern), borderline abnormal ECG pattern, slight widening of QRS from 2024 ECG. Personally reviewed in office.    2024 ECG: Sinus rhythm, 61 bpm, incomplete LBBB pattern ( msec), borderline abnormal ECG. Personally reviewed in " "office.    2023 ECG: Sinus rhythm, 54 bpm, borderline IVCD (QRS duration 112 msec), borderline ECG.     Lab Results   Component Value Date    CHOL 145 2024    CHOL 150 03/10/2023    CHOL 160 2022     Lab Results   Component Value Date    HDL 82.0 2024    HDL 77.3 03/10/2023    HDL 88.0 2022     Lab Results   Component Value Date    LDLCALC 54 2024     Lab Results   Component Value Date    TRIG 44 2024    TRIG 49 03/10/2023    TRIG 40 2022     No components found for: \"CHOLHDL\"      Assessment/Plan   1. Coronary arteriosclerosis:  9/10/2019 CT calcium score is 1054. FLORES 10 year risk score 16% based on risk factors at time of scan.     Will continue the followin. Continue aspirin 81 mg a day for life  2. Continue atorvastatin 20 mg a day. Goal LDL < 70 on 2024 lipid panel with level of 54.   3. Lifestyle modification of diet and exercise discussed in detail.     2. Thoracic aorta enlargement:  Stable size on 2024 echocardiogram compared to 2021-2023 studies with proximal ascending aorta 4.1 cm. Size has been stable at 4.1 cm since . Repeat echocardiogram in late half  for 2 year follow up imaging test.     Blood pressure and HR at goal to minimize aorta size progression.     3. Hypertension:  Blood pressure at goal on ambulatory monitoring with range 110s-120s mm Hg. SBP goal range 120-130 mm Hg. Continue amlodipine 10 mg and lisinopril 40 mg a day.     ECG shows progression of QRS widening to IVCD from prior incomplete LBBB pattern. Likely hypertensive related. Will monitor with serial ECGs and correlate with echocardiogram monitoring thoracic aortic aneurysm.    Follow up with Dr. Contreras in 6 months.           SIGNATURE: Juancarlos Contreras MD PATIENT NAME: Jose Gamez   DATE/TIME: 2025 8:25 AM MRN: [id number]     "

## 2025-02-19 NOTE — PATIENT INSTRUCTIONS
You were seen in the [facility] for your coronary artery disease with high calcium score.     Your September 2019 calcium score was 1054 which implies a high amount of atherosclerosis (hardening of the heart arteries). Based on your current risk factors (former smoking, cholesterol numbers), you had a 16% chance of having a heart attack in the next 10 years before we made changes.     I recommend that we do the following to reduce your heart attack risk:  1. Continue to take aspirin 81 mg a day for life. You can take enteric coated (EC) tablets    2. Continue to take your cholesterol medication, atorvastatin 20 mg a day. This medication has reduced your cholesterol numbers to goal (LDL, the bad cholesterol is down 50% from 132 prior to statin therapy to 54 in March 2024) and can help remove cholesterol plaque from the artery walls). Long term LDL goal < 70.    3. Moderate intensity exercise at least 3 times a week for 30-45 minutes a time. Exercise helps protect the heart and blood vessels. Leg stretching will help you with your hamstring stiffness to may activity easier to do.    4. Eat a heart healthy diet. Limit portions of red meat. Eat fresh fruits and vegetables instead of processed carbohydrates. Olive oil (1 tablespoon a day) as a source of omega-3 fat. The Mediterranean diet has been shown in clinical trials to reduce risk of heart disease by following these principles.     Your August 2024 echocardiogram was the same size as in August 2023 with aorta size of 4.1 cm. I recommend that we repeat this test to measure the size of your thoracic aorta again in 2 years in August 2026  since the size of your aorta has been unchanged over the last 3 years.      Your blood pressure has been at your long term goal range of 120-130 mm Hg with home readings 110s-120s mm Hg. Continue amlodipine 10 mg a day and lisinopril 40 mg a day.      Continue to check your blood pressure and keep a log  book as you have been doing. Sit down and rest for 3-5 minutes and then place your arm on the table to keep it level with your heart for best measurements.     Follow up with Dr. Contreras in 6 months.

## 2025-02-21 LAB
ATRIAL RATE: 59 BPM
P AXIS: 32 DEGREES
P OFFSET: 184 MS
P ONSET: 130 MS
PR INTERVAL: 180 MS
Q ONSET: 220 MS
QRS COUNT: 10 BEATS
QRS DURATION: 130 MS
QT INTERVAL: 408 MS
QTC CALCULATION(BAZETT): 403 MS
QTC FREDERICIA: 405 MS
R AXIS: -41 DEGREES
T AXIS: 66 DEGREES
T OFFSET: 424 MS
VENTRICULAR RATE: 59 BPM

## 2025-03-13 ENCOUNTER — APPOINTMENT (OUTPATIENT)
Dept: PRIMARY CARE | Facility: CLINIC | Age: 80
End: 2025-03-13
Payer: MEDICARE

## 2025-03-13 VITALS
WEIGHT: 173 LBS | HEART RATE: 63 BPM | OXYGEN SATURATION: 99 % | HEIGHT: 69 IN | TEMPERATURE: 97.3 F | RESPIRATION RATE: 16 BRPM | DIASTOLIC BLOOD PRESSURE: 62 MMHG | BODY MASS INDEX: 25.62 KG/M2 | SYSTOLIC BLOOD PRESSURE: 120 MMHG

## 2025-03-13 DIAGNOSIS — I10 PRIMARY HYPERTENSION: ICD-10-CM

## 2025-03-13 DIAGNOSIS — R73.9 HYPERGLYCEMIA: ICD-10-CM

## 2025-03-13 DIAGNOSIS — Z12.5 PROSTATE CANCER SCREENING: ICD-10-CM

## 2025-03-13 DIAGNOSIS — Z00.00 MEDICARE ANNUAL WELLNESS VISIT, SUBSEQUENT: Primary | ICD-10-CM

## 2025-03-13 PROCEDURE — 1160F RVW MEDS BY RX/DR IN RCRD: CPT | Performed by: INTERNAL MEDICINE

## 2025-03-13 PROCEDURE — 99214 OFFICE O/P EST MOD 30 MIN: CPT | Performed by: INTERNAL MEDICINE

## 2025-03-13 PROCEDURE — 1159F MED LIST DOCD IN RCRD: CPT | Performed by: INTERNAL MEDICINE

## 2025-03-13 PROCEDURE — 3078F DIAST BP <80 MM HG: CPT | Performed by: INTERNAL MEDICINE

## 2025-03-13 PROCEDURE — 1036F TOBACCO NON-USER: CPT | Performed by: INTERNAL MEDICINE

## 2025-03-13 PROCEDURE — 3074F SYST BP LT 130 MM HG: CPT | Performed by: INTERNAL MEDICINE

## 2025-03-13 PROCEDURE — 1170F FXNL STATUS ASSESSED: CPT | Performed by: INTERNAL MEDICINE

## 2025-03-13 PROCEDURE — G0439 PPPS, SUBSEQ VISIT: HCPCS | Performed by: INTERNAL MEDICINE

## 2025-03-13 PROCEDURE — G2211 COMPLEX E/M VISIT ADD ON: HCPCS | Performed by: INTERNAL MEDICINE

## 2025-03-13 PROCEDURE — 1157F ADVNC CARE PLAN IN RCRD: CPT | Performed by: INTERNAL MEDICINE

## 2025-03-13 ASSESSMENT — ENCOUNTER SYMPTOMS
CONSTIPATION: 0
BLOOD IN STOOL: 0
ABDOMINAL PAIN: 0
SLEEP DISTURBANCE: 1
FREQUENCY: 0
DIARRHEA: 0

## 2025-03-13 ASSESSMENT — ACTIVITIES OF DAILY LIVING (ADL)
DRESSING: INDEPENDENT
MANAGING_FINANCES: INDEPENDENT
BATHING: INDEPENDENT
TAKING_MEDICATION: INDEPENDENT
GROCERY_SHOPPING: INDEPENDENT
DOING_HOUSEWORK: INDEPENDENT

## 2025-03-13 NOTE — PROGRESS NOTES
Patient here for a medicare wellness visit and follow up    Subjective   Patient ID: Jose Gamez is a 79 y.o. male who presents for Follow-up and Medicare Annual Wellness Visit Subsequent.    The patient wears a hearing aid device, and finds that this is working well.  He denies any vision changes, and undergoes regular eye exams.    The patient notes nocturia of up to five times per evening.  He notes poor to average sleep quality as a result.  The patient denies any other significant urinary symptoms.    The patient denies any abdominal pain, hematochezia, melena, or bowel problems.       Review of Systems   HENT:  Positive for hearing loss.    Eyes:  Negative for visual disturbance.   Gastrointestinal:  Negative for abdominal pain, blood in stool, constipation and diarrhea.   Genitourinary:  Negative for frequency.        Positive for nocturia up to five times per evening.   Psychiatric/Behavioral:  Positive for sleep disturbance.        Objective   Physical Exam  Constitutional:       Appearance: Normal appearance.   Neck:      Vascular: No carotid bruit.   Cardiovascular:      Rate and Rhythm: Normal rate and regular rhythm.      Heart sounds: Normal heart sounds.   Pulmonary:      Effort: Pulmonary effort is normal.      Breath sounds: Normal breath sounds.   Abdominal:      General: Bowel sounds are normal.      Palpations: Abdomen is soft.      Tenderness: There is no abdominal tenderness.   Skin:     General: Skin is warm and dry.   Neurological:      General: No focal deficit present.      Mental Status: He is alert and oriented to person, place, and time. Mental status is at baseline.   Psychiatric:         Mood and Affect: Mood normal.         Behavior: Behavior normal.         Assessment/Plan   Problem List Items Addressed This Visit             ICD-10-CM    Hyperglycemia - Primary R73.9    Relevant Orders    Hemoglobin A1c    Hypertension I10    Relevant Orders    Lipid panel    CBC and Auto  Differential    Comprehensive metabolic panel     Other Visit Diagnoses         Codes    Prostate cancer screening     Z12.5    Relevant Orders    Prostate Spec.Ag,Screen            Medicare Wellness Examination Done  -  Discussed healthy diet and regular exercise.    -  Physical exam overall unremarkable. Immunizations reviewed and updated accordingly. Healthy lifestyle choices discussed (tobacco avoidance, appropriate alcohol use, avoidance of illicit substances).   -  Patient is wearing seatbelt.   -  Screening lab work ordered as indicated.    -  Age appropriate screening tests reviewed with patient.       IMPRESSION/PLAN:     Elevated Glucose   - Last a1c 5.0% - 3/2024. Glucose elevated at 131 per 3/2024 CMP.  Ordered HbA1c.    HTN   - /62 in the office today.  Continue on lisinopril 40mg QD and amlodipine 10mg QD.      HLD   - Stable, continue on atorvastatin 20mg QD.  ASCVD 29.4% per 3/2024.     CAD   - Following with Dr. Contreras in Cardiology, takes ASA 81mg QD. Echocardiogram performed 8/2021 showed impaired relaxation patter of left ventricular diastolic filling and left atrium with mild-moderate dilation. Repeat in 8/2022 stable.     Tension Headaches   - Continue Fioricet -40mg as needed for onset of headache.     History of colon cancer  - Following w/ Dr. Thayer.  Last colonoscopy 12/2022, repeat due 12/2025 due to poor prep.     Health Maintenance   - Routine labs ordered including CBC, CMP, and a lipid panel to be completed in the fasting state. Added PSA. Last PSA wnl 3/2024.  Last colonoscopy 12/2022, repeat due 12/2025 due to poor prep.  Pneumonia, Shingrix, Tdap, and RSV UTD.       Follow-up in 6 months, call sooner if needed.       Scribe Attestation  By signing my name below, IRenetta Scribe   attest that this documentation has been prepared under the direction and in the presence of Perry Samaniego DO.   Renetta Pettit 03/13/25 8:33 AM

## 2025-03-20 ENCOUNTER — APPOINTMENT (OUTPATIENT)
Dept: PRIMARY CARE | Facility: CLINIC | Age: 80
End: 2025-03-20
Payer: MEDICARE

## 2025-04-02 LAB
ALBUMIN SERPL-MCNC: 4.3 G/DL (ref 3.6–5.1)
ALP SERPL-CCNC: 67 U/L (ref 35–144)
ALT SERPL-CCNC: 21 U/L (ref 9–46)
ANION GAP SERPL CALCULATED.4IONS-SCNC: 8 MMOL/L (CALC) (ref 7–17)
AST SERPL-CCNC: 26 U/L (ref 10–35)
BASOPHILS # BLD AUTO: 32 CELLS/UL (ref 0–200)
BASOPHILS NFR BLD AUTO: 0.7 %
BILIRUB SERPL-MCNC: 0.8 MG/DL (ref 0.2–1.2)
BUN SERPL-MCNC: 11 MG/DL (ref 7–25)
CALCIUM SERPL-MCNC: 8.8 MG/DL (ref 8.6–10.3)
CHLORIDE SERPL-SCNC: 98 MMOL/L (ref 98–110)
CHOLEST SERPL-MCNC: 149 MG/DL
CHOLEST/HDLC SERPL: 1.8 (CALC)
CO2 SERPL-SCNC: 27 MMOL/L (ref 20–32)
CREAT SERPL-MCNC: 0.81 MG/DL (ref 0.7–1.22)
EGFRCR SERPLBLD CKD-EPI 2021: 89 ML/MIN/1.73M2
EOSINOPHIL # BLD AUTO: 189 CELLS/UL (ref 15–500)
EOSINOPHIL NFR BLD AUTO: 4.2 %
ERYTHROCYTE [DISTWIDTH] IN BLOOD BY AUTOMATED COUNT: 11.1 % (ref 11–15)
EST. AVERAGE GLUCOSE BLD GHB EST-MCNC: 100 MG/DL
EST. AVERAGE GLUCOSE BLD GHB EST-SCNC: 5.5 MMOL/L
GLUCOSE SERPL-MCNC: 121 MG/DL (ref 65–99)
HBA1C MFR BLD: 5.1 % OF TOTAL HGB
HCT VFR BLD AUTO: 42.2 % (ref 38.5–50)
HDLC SERPL-MCNC: 84 MG/DL
HGB BLD-MCNC: 14.1 G/DL (ref 13.2–17.1)
LDLC SERPL CALC-MCNC: 54 MG/DL (CALC)
LYMPHOCYTES # BLD AUTO: 2205 CELLS/UL (ref 850–3900)
LYMPHOCYTES NFR BLD AUTO: 49 %
MCH RBC QN AUTO: 33.2 PG (ref 27–33)
MCHC RBC AUTO-ENTMCNC: 33.4 G/DL (ref 32–36)
MCV RBC AUTO: 99.3 FL (ref 80–100)
MONOCYTES # BLD AUTO: 428 CELLS/UL (ref 200–950)
MONOCYTES NFR BLD AUTO: 9.5 %
NEUTROPHILS # BLD AUTO: 1647 CELLS/UL (ref 1500–7800)
NEUTROPHILS NFR BLD AUTO: 36.6 %
NONHDLC SERPL-MCNC: 65 MG/DL (CALC)
PLATELET # BLD AUTO: 243 THOUSAND/UL (ref 140–400)
PMV BLD REES-ECKER: 9.4 FL (ref 7.5–12.5)
POTASSIUM SERPL-SCNC: 4.9 MMOL/L (ref 3.5–5.3)
PROT SERPL-MCNC: 6.4 G/DL (ref 6.1–8.1)
PSA SERPL-MCNC: 0.5 NG/ML
RBC # BLD AUTO: 4.25 MILLION/UL (ref 4.2–5.8)
SODIUM SERPL-SCNC: 133 MMOL/L (ref 135–146)
TRIGL SERPL-MCNC: 40 MG/DL
WBC # BLD AUTO: 4.5 THOUSAND/UL (ref 3.8–10.8)

## 2025-04-29 DIAGNOSIS — I10 PRIMARY HYPERTENSION: ICD-10-CM

## 2025-04-29 RX ORDER — AMLODIPINE BESYLATE 10 MG/1
10 TABLET ORAL DAILY
Qty: 90 TABLET | Refills: 2 | Status: SHIPPED | OUTPATIENT
Start: 2025-04-29

## 2025-07-27 ENCOUNTER — APPOINTMENT (OUTPATIENT)
Dept: CARDIOLOGY | Facility: HOSPITAL | Age: 80
DRG: 069 | End: 2025-07-27
Payer: MEDICARE

## 2025-07-27 ENCOUNTER — HOSPITAL ENCOUNTER (INPATIENT)
Facility: HOSPITAL | Age: 80
DRG: 069 | End: 2025-07-27
Attending: STUDENT IN AN ORGANIZED HEALTH CARE EDUCATION/TRAINING PROGRAM | Admitting: STUDENT IN AN ORGANIZED HEALTH CARE EDUCATION/TRAINING PROGRAM
Payer: MEDICARE

## 2025-07-27 ENCOUNTER — APPOINTMENT (OUTPATIENT)
Dept: RADIOLOGY | Facility: HOSPITAL | Age: 80
DRG: 069 | End: 2025-07-27
Payer: MEDICARE

## 2025-07-27 VITALS
SYSTOLIC BLOOD PRESSURE: 108 MMHG | BODY MASS INDEX: 25.92 KG/M2 | OXYGEN SATURATION: 99 % | TEMPERATURE: 97.3 F | HEIGHT: 69 IN | WEIGHT: 175 LBS | RESPIRATION RATE: 18 BRPM | DIASTOLIC BLOOD PRESSURE: 64 MMHG | HEART RATE: 64 BPM

## 2025-07-27 DIAGNOSIS — G45.9 TIA (TRANSIENT ISCHEMIC ATTACK): ICD-10-CM

## 2025-07-27 DIAGNOSIS — E87.1 HYPONATREMIA: Primary | ICD-10-CM

## 2025-07-27 PROBLEM — G43.109 MIGRAINE AURA WITHOUT HEADACHE: Status: ACTIVE | Noted: 2025-07-27

## 2025-07-27 LAB
ALBUMIN SERPL BCP-MCNC: 4.2 G/DL (ref 3.4–5)
ALP SERPL-CCNC: 56 U/L (ref 33–136)
ALT SERPL W P-5'-P-CCNC: 23 U/L (ref 10–52)
ANION GAP SERPL CALC-SCNC: 12 MMOL/L
ANION GAP SERPL CALC-SCNC: 12 MMOL/L (ref 10–20)
APTT PPP: 28 SECONDS (ref 26–36)
AST SERPL W P-5'-P-CCNC: 29 U/L (ref 9–39)
ATRIAL RATE: 75 BPM
BASOPHILS # BLD AUTO: 0.03 X10*3/UL (ref 0–0.1)
BASOPHILS NFR BLD AUTO: 0.8 %
BILIRUB SERPL-MCNC: 0.7 MG/DL (ref 0–1.2)
BUN SERPL-MCNC: 12 MG/DL (ref 6–23)
BUN SERPL-MCNC: 12 MG/DL (ref 6–23)
CALCIUM SERPL-MCNC: 8.5 MG/DL (ref 8.6–10.3)
CALCIUM SERPL-MCNC: 9.1 MG/DL (ref 8.6–10.3)
CARDIAC TROPONIN I PNL SERPL HS: 4 NG/L (ref 0–20)
CHLORIDE SERPL-SCNC: 94 MMOL/L (ref 98–107)
CHLORIDE SERPL-SCNC: 97 MMOL/L (ref 98–107)
CHLORIDE UR-SCNC: 57 MMOL/L
CHLORIDE/CREATININE (MMOL/G) IN URINE: 383 MMOL/G CREAT (ref 23–275)
CO2 SERPL-SCNC: 23 MMOL/L (ref 21–32)
CO2 SERPL-SCNC: 25 MMOL/L (ref 21–32)
CREAT SERPL-MCNC: 0.76 MG/DL (ref 0.5–1.3)
CREAT SERPL-MCNC: 0.83 MG/DL (ref 0.5–1.3)
CREAT UR-MCNC: 14.9 MG/DL (ref 20–370)
EGFRCR SERPLBLD CKD-EPI 2021: 88 ML/MIN/1.73M*2
EGFRCR SERPLBLD CKD-EPI 2021: 90 ML/MIN/1.73M*2
EOSINOPHIL # BLD AUTO: 0.13 X10*3/UL (ref 0–0.4)
EOSINOPHIL NFR BLD AUTO: 3.3 %
ERYTHROCYTE [DISTWIDTH] IN BLOOD BY AUTOMATED COUNT: 11.5 % (ref 11.5–14.5)
GLUCOSE BLD MANUAL STRIP-MCNC: 122 MG/DL (ref 74–99)
GLUCOSE SERPL-MCNC: 126 MG/DL (ref 74–99)
GLUCOSE SERPL-MCNC: 132 MG/DL (ref 74–99)
HCT VFR BLD AUTO: 40.8 % (ref 41–52)
HGB BLD-MCNC: 13.9 G/DL (ref 13.5–17.5)
IMM GRANULOCYTES # BLD AUTO: 0.01 X10*3/UL (ref 0–0.5)
IMM GRANULOCYTES NFR BLD AUTO: 0.3 % (ref 0–0.9)
INR PPP: 1.1 (ref 0.9–1.1)
LYMPHOCYTES # BLD AUTO: 1.74 X10*3/UL (ref 0.8–3)
LYMPHOCYTES NFR BLD AUTO: 44.2 %
MCH RBC QN AUTO: 33.1 PG (ref 26–34)
MCHC RBC AUTO-ENTMCNC: 34.1 G/DL (ref 32–36)
MCV RBC AUTO: 97 FL (ref 80–100)
MONOCYTES # BLD AUTO: 0.46 X10*3/UL (ref 0.05–0.8)
MONOCYTES NFR BLD AUTO: 11.7 %
NEUTROPHILS # BLD AUTO: 1.57 X10*3/UL (ref 1.6–5.5)
NEUTROPHILS NFR BLD AUTO: 39.7 %
NRBC BLD-RTO: 0 /100 WBCS (ref 0–0)
OSMOLALITY SERPL: 275 MOSM/KG (ref 280–300)
OSMOLALITY UR: 239 MOSM/KG (ref 200–1200)
P AXIS: 56 DEGREES
P OFFSET: 167 MS
P ONSET: 115 MS
PLATELET # BLD AUTO: 219 X10*3/UL (ref 150–450)
POTASSIUM SERPL-SCNC: 3.9 MMOL/L (ref 3.5–5.3)
POTASSIUM SERPL-SCNC: 4.1 MMOL/L (ref 3.5–5.3)
POTASSIUM UR-SCNC: 28 MMOL/L
POTASSIUM/CREAT UR-RTO: 188 MMOL/G CREAT
PR INTERVAL: 188 MS
PROT SERPL-MCNC: 6.3 G/DL (ref 6.4–8.2)
PROTHROMBIN TIME: 11.6 SECONDS (ref 9.8–12.4)
Q ONSET: 209 MS
QRS COUNT: 12 BEATS
QRS DURATION: 124 MS
QT INTERVAL: 382 MS
QTC CALCULATION(BAZETT): 426 MS
QTC FREDERICIA: 411 MS
R AXIS: -45 DEGREES
RBC # BLD AUTO: 4.2 X10*6/UL (ref 4.5–5.9)
SODIUM SERPL-SCNC: 125 MMOL/L (ref 136–145)
SODIUM SERPL-SCNC: 130 MMOL/L (ref 136–145)
SODIUM UR-SCNC: 57 MMOL/L
SODIUM/CREAT UR-RTO: 383 MMOL/G CREAT
T AXIS: 68 DEGREES
T OFFSET: 400 MS
VENTRICULAR RATE: 75 BPM
WBC # BLD AUTO: 3.9 X10*3/UL (ref 4.4–11.3)

## 2025-07-27 PROCEDURE — 1200000002 HC GENERAL ROOM WITH TELEMETRY DAILY

## 2025-07-27 PROCEDURE — 36415 COLL VENOUS BLD VENIPUNCTURE: CPT | Performed by: STUDENT IN AN ORGANIZED HEALTH CARE EDUCATION/TRAINING PROGRAM

## 2025-07-27 PROCEDURE — 70496 CT ANGIOGRAPHY HEAD: CPT | Performed by: RADIOLOGY

## 2025-07-27 PROCEDURE — 2500000004 HC RX 250 GENERAL PHARMACY W/ HCPCS (ALT 636 FOR OP/ED)

## 2025-07-27 PROCEDURE — 2550000001 HC RX 255 CONTRASTS: Performed by: STUDENT IN AN ORGANIZED HEALTH CARE EDUCATION/TRAINING PROGRAM

## 2025-07-27 PROCEDURE — 82947 ASSAY GLUCOSE BLOOD QUANT: CPT

## 2025-07-27 PROCEDURE — 70498 CT ANGIOGRAPHY NECK: CPT | Performed by: RADIOLOGY

## 2025-07-27 PROCEDURE — 93005 ELECTROCARDIOGRAM TRACING: CPT

## 2025-07-27 PROCEDURE — G0508 CRIT CARE TELEHEA CONSULT 60: HCPCS | Performed by: PSYCHIATRY & NEUROLOGY

## 2025-07-27 PROCEDURE — 85730 THROMBOPLASTIN TIME PARTIAL: CPT

## 2025-07-27 PROCEDURE — 85610 PROTHROMBIN TIME: CPT

## 2025-07-27 PROCEDURE — 85025 COMPLETE CBC W/AUTO DIFF WBC: CPT

## 2025-07-27 PROCEDURE — 99285 EMERGENCY DEPT VISIT HI MDM: CPT

## 2025-07-27 PROCEDURE — 2500000001 HC RX 250 WO HCPCS SELF ADMINISTERED DRUGS (ALT 637 FOR MEDICARE OP): Performed by: STUDENT IN AN ORGANIZED HEALTH CARE EDUCATION/TRAINING PROGRAM

## 2025-07-27 PROCEDURE — 99285 EMERGENCY DEPT VISIT HI MDM: CPT | Mod: 25 | Performed by: STUDENT IN AN ORGANIZED HEALTH CARE EDUCATION/TRAINING PROGRAM

## 2025-07-27 PROCEDURE — 99223 1ST HOSP IP/OBS HIGH 75: CPT | Performed by: STUDENT IN AN ORGANIZED HEALTH CARE EDUCATION/TRAINING PROGRAM

## 2025-07-27 PROCEDURE — 80053 COMPREHEN METABOLIC PANEL: CPT

## 2025-07-27 PROCEDURE — 83935 ASSAY OF URINE OSMOLALITY: CPT | Mod: STJLAB | Performed by: STUDENT IN AN ORGANIZED HEALTH CARE EDUCATION/TRAINING PROGRAM

## 2025-07-27 PROCEDURE — 70498 CT ANGIOGRAPHY NECK: CPT

## 2025-07-27 PROCEDURE — 2500000004 HC RX 250 GENERAL PHARMACY W/ HCPCS (ALT 636 FOR OP/ED): Performed by: STUDENT IN AN ORGANIZED HEALTH CARE EDUCATION/TRAINING PROGRAM

## 2025-07-27 PROCEDURE — 84484 ASSAY OF TROPONIN QUANT: CPT

## 2025-07-27 PROCEDURE — 36415 COLL VENOUS BLD VENIPUNCTURE: CPT

## 2025-07-27 PROCEDURE — 70450 CT HEAD/BRAIN W/O DYE: CPT

## 2025-07-27 PROCEDURE — 82436 ASSAY OF URINE CHLORIDE: CPT | Performed by: STUDENT IN AN ORGANIZED HEALTH CARE EDUCATION/TRAINING PROGRAM

## 2025-07-27 PROCEDURE — 2500000001 HC RX 250 WO HCPCS SELF ADMINISTERED DRUGS (ALT 637 FOR MEDICARE OP)

## 2025-07-27 PROCEDURE — 80048 BASIC METABOLIC PNL TOTAL CA: CPT | Mod: CCI | Performed by: STUDENT IN AN ORGANIZED HEALTH CARE EDUCATION/TRAINING PROGRAM

## 2025-07-27 PROCEDURE — 70450 CT HEAD/BRAIN W/O DYE: CPT | Performed by: RADIOLOGY

## 2025-07-27 PROCEDURE — 83930 ASSAY OF BLOOD OSMOLALITY: CPT | Mod: STJLAB | Performed by: STUDENT IN AN ORGANIZED HEALTH CARE EDUCATION/TRAINING PROGRAM

## 2025-07-27 RX ORDER — HYDRALAZINE HYDROCHLORIDE 20 MG/ML
10 INJECTION INTRAMUSCULAR; INTRAVENOUS
Status: DISCONTINUED | OUTPATIENT
Start: 2025-07-27 | End: 2025-07-28 | Stop reason: HOSPADM

## 2025-07-27 RX ORDER — ASPIRIN 81 MG/1
81 TABLET ORAL DAILY
Status: DISCONTINUED | OUTPATIENT
Start: 2025-07-28 | End: 2025-07-28 | Stop reason: HOSPADM

## 2025-07-27 RX ORDER — CLOPIDOGREL BISULFATE 300 MG/1
300 TABLET, FILM COATED ORAL ONCE
Status: COMPLETED | OUTPATIENT
Start: 2025-07-27 | End: 2025-07-27

## 2025-07-27 RX ORDER — AMLODIPINE BESYLATE 10 MG/1
10 TABLET ORAL DAILY
Status: DISCONTINUED | OUTPATIENT
Start: 2025-07-28 | End: 2025-07-28 | Stop reason: HOSPADM

## 2025-07-27 RX ORDER — ATORVASTATIN CALCIUM 20 MG/1
20 TABLET, FILM COATED ORAL NIGHTLY
Status: DISCONTINUED | OUTPATIENT
Start: 2025-07-27 | End: 2025-07-28 | Stop reason: HOSPADM

## 2025-07-27 RX ORDER — LABETALOL HYDROCHLORIDE 5 MG/ML
10 INJECTION, SOLUTION INTRAVENOUS EVERY 10 MIN PRN
Status: DISCONTINUED | OUTPATIENT
Start: 2025-07-27 | End: 2025-07-28 | Stop reason: HOSPADM

## 2025-07-27 RX ORDER — ENOXAPARIN SODIUM 100 MG/ML
40 INJECTION SUBCUTANEOUS EVERY 24 HOURS
Status: DISCONTINUED | OUTPATIENT
Start: 2025-07-27 | End: 2025-07-28 | Stop reason: HOSPADM

## 2025-07-27 RX ORDER — ASPIRIN 325 MG
325 TABLET ORAL ONCE
Status: COMPLETED | OUTPATIENT
Start: 2025-07-27 | End: 2025-07-27

## 2025-07-27 RX ORDER — LISINOPRIL 40 MG/1
40 TABLET ORAL
Status: DISCONTINUED | OUTPATIENT
Start: 2025-07-28 | End: 2025-07-28 | Stop reason: HOSPADM

## 2025-07-27 RX ADMIN — ATORVASTATIN CALCIUM 20 MG: 20 TABLET, FILM COATED ORAL at 20:12

## 2025-07-27 RX ADMIN — ASPIRIN 325 MG: 325 TABLET ORAL at 13:11

## 2025-07-27 RX ADMIN — ENOXAPARIN SODIUM 40 MG: 100 INJECTION SUBCUTANEOUS at 15:36

## 2025-07-27 RX ADMIN — SODIUM CHLORIDE 1000 ML: 0.9 INJECTION, SOLUTION INTRAVENOUS at 14:08

## 2025-07-27 RX ADMIN — IOHEXOL 70 ML: 350 INJECTION, SOLUTION INTRAVENOUS at 11:35

## 2025-07-27 RX ADMIN — CLOPIDOGREL BISULFATE 300 MG: 300 TABLET, FILM COATED ORAL at 13:11

## 2025-07-27 SDOH — ECONOMIC STABILITY: FOOD INSECURITY: WITHIN THE PAST 12 MONTHS, THE FOOD YOU BOUGHT JUST DIDN'T LAST AND YOU DIDN'T HAVE MONEY TO GET MORE.: NEVER TRUE

## 2025-07-27 SDOH — SOCIAL STABILITY: SOCIAL INSECURITY: WITHIN THE LAST YEAR, HAVE YOU BEEN AFRAID OF YOUR PARTNER OR EX-PARTNER?: NO

## 2025-07-27 SDOH — ECONOMIC STABILITY: FOOD INSECURITY: WITHIN THE PAST 12 MONTHS, YOU WORRIED THAT YOUR FOOD WOULD RUN OUT BEFORE YOU GOT THE MONEY TO BUY MORE.: NEVER TRUE

## 2025-07-27 SDOH — SOCIAL STABILITY: SOCIAL INSECURITY: WITHIN THE LAST YEAR, HAVE YOU BEEN HUMILIATED OR EMOTIONALLY ABUSED IN OTHER WAYS BY YOUR PARTNER OR EX-PARTNER?: NO

## 2025-07-27 SDOH — SOCIAL STABILITY: SOCIAL INSECURITY
WITHIN THE LAST YEAR, HAVE YOU BEEN RAPED OR FORCED TO HAVE ANY KIND OF SEXUAL ACTIVITY BY YOUR PARTNER OR EX-PARTNER?: NO

## 2025-07-27 SDOH — ECONOMIC STABILITY: INCOME INSECURITY: IN THE PAST 12 MONTHS HAS THE ELECTRIC, GAS, OIL, OR WATER COMPANY THREATENED TO SHUT OFF SERVICES IN YOUR HOME?: NO

## 2025-07-27 SDOH — SOCIAL STABILITY: SOCIAL INSECURITY
WITHIN THE LAST YEAR, HAVE YOU BEEN KICKED, HIT, SLAPPED, OR OTHERWISE PHYSICALLY HURT BY YOUR PARTNER OR EX-PARTNER?: NO

## 2025-07-27 SDOH — SOCIAL STABILITY: SOCIAL INSECURITY: DOES ANYONE TRY TO KEEP YOU FROM HAVING/CONTACTING OTHER FRIENDS OR DOING THINGS OUTSIDE YOUR HOME?: NO

## 2025-07-27 SDOH — SOCIAL STABILITY: SOCIAL INSECURITY: ARE YOU OR HAVE YOU BEEN THREATENED OR ABUSED PHYSICALLY, EMOTIONALLY, OR SEXUALLY BY ANYONE?: NO

## 2025-07-27 SDOH — SOCIAL STABILITY: SOCIAL INSECURITY: ABUSE: ADULT

## 2025-07-27 SDOH — SOCIAL STABILITY: SOCIAL INSECURITY: HAS ANYONE EVER THREATENED TO HURT YOUR FAMILY OR YOUR PETS?: NO

## 2025-07-27 SDOH — SOCIAL STABILITY: SOCIAL INSECURITY: DO YOU FEEL ANYONE HAS EXPLOITED OR TAKEN ADVANTAGE OF YOU FINANCIALLY OR OF YOUR PERSONAL PROPERTY?: NO

## 2025-07-27 SDOH — SOCIAL STABILITY: SOCIAL INSECURITY: ARE THERE ANY APPARENT SIGNS OF INJURIES/BEHAVIORS THAT COULD BE RELATED TO ABUSE/NEGLECT?: NO

## 2025-07-27 SDOH — SOCIAL STABILITY: SOCIAL INSECURITY: HAVE YOU HAD THOUGHTS OF HARMING ANYONE ELSE?: NO

## 2025-07-27 SDOH — SOCIAL STABILITY: SOCIAL INSECURITY: DO YOU FEEL UNSAFE GOING BACK TO THE PLACE WHERE YOU ARE LIVING?: NO

## 2025-07-27 SDOH — SOCIAL STABILITY: SOCIAL INSECURITY: WERE YOU ABLE TO COMPLETE ALL THE BEHAVIORAL HEALTH SCREENINGS?: YES

## 2025-07-27 ASSESSMENT — LIFESTYLE VARIABLES
HOW OFTEN DURING THE LAST YEAR HAVE YOU HAD A FEELING OF GUILT OR REMORSE AFTER DRINKING: NEVER
HAS A RELATIVE, FRIEND, DOCTOR, OR ANOTHER HEALTH PROFESSIONAL EXPRESSED CONCERN ABOUT YOUR DRINKING OR SUGGESTED YOU CUT DOWN: NO
SKIP TO QUESTIONS 9-10: 1
AUDIT TOTAL SCORE: 4
AUDIT TOTAL SCORE: 0
HOW OFTEN DURING THE LAST YEAR HAVE YOU BEEN UNABLE TO REMEMBER WHAT HAPPENED THE NIGHT BEFORE BECAUSE YOU HAD BEEN DRINKING: NEVER
HOW OFTEN DO YOU HAVE 6 OR MORE DRINKS ON ONE OCCASION: NEVER
HAVE YOU OR SOMEONE ELSE BEEN INJURED AS A RESULT OF YOUR DRINKING: NO
HOW OFTEN DURING THE LAST YEAR HAVE YOU NEEDED AN ALCOHOLIC DRINK FIRST THING IN THE MORNING TO GET YOURSELF GOING AFTER A NIGHT OF HEAVY DRINKING: NEVER
HOW OFTEN DURING THE LAST YEAR HAVE YOU FOUND THAT YOU WERE NOT ABLE TO STOP DRINKING ONCE YOU HAD STARTED: NEVER
HOW OFTEN DO YOU HAVE A DRINK CONTAINING ALCOHOL: 4 OR MORE TIMES A WEEK
AUDIT-C TOTAL SCORE: 4
HOW MANY STANDARD DRINKS CONTAINING ALCOHOL DO YOU HAVE ON A TYPICAL DAY: 1 OR 2
AUDIT-C TOTAL SCORE: 4
HOW OFTEN DURING THE LAST YEAR HAVE YOU FAILED TO DO WHAT WAS NORMALLY EXPECTED FROM YOU BECAUSE OF DRINKING: NEVER

## 2025-07-27 ASSESSMENT — COGNITIVE AND FUNCTIONAL STATUS - GENERAL
DAILY ACTIVITIY SCORE: 24
DAILY ACTIVITIY SCORE: 24
MOBILITY SCORE: 24
MOBILITY SCORE: 24
PATIENT BASELINE BEDBOUND: NO

## 2025-07-27 ASSESSMENT — ACTIVITIES OF DAILY LIVING (ADL)
HEARING - RIGHT EAR: HEARING AID
GROOMING: INDEPENDENT
FEEDING YOURSELF: INDEPENDENT
HEARING - LEFT EAR: HEARING AID
PATIENT'S MEMORY ADEQUATE TO SAFELY COMPLETE DAILY ACTIVITIES?: YES
ADEQUATE_TO_COMPLETE_ADL: YES
LACK_OF_TRANSPORTATION: NO
DRESSING YOURSELF: INDEPENDENT
TOILETING: INDEPENDENT
WALKS IN HOME: INDEPENDENT
BATHING: INDEPENDENT
JUDGMENT_ADEQUATE_SAFELY_COMPLETE_DAILY_ACTIVITIES: YES

## 2025-07-27 ASSESSMENT — PAIN - FUNCTIONAL ASSESSMENT: PAIN_FUNCTIONAL_ASSESSMENT: 0-10

## 2025-07-27 ASSESSMENT — PAIN SCALES - GENERAL
PAINLEVEL_OUTOF10: 0 - NO PAIN
PAINLEVEL_OUTOF10: 0 - NO PAIN

## 2025-07-27 ASSESSMENT — PATIENT HEALTH QUESTIONNAIRE - PHQ9
1. LITTLE INTEREST OR PLEASURE IN DOING THINGS: NOT AT ALL
SUM OF ALL RESPONSES TO PHQ9 QUESTIONS 1 & 2: 0
2. FEELING DOWN, DEPRESSED OR HOPELESS: NOT AT ALL

## 2025-07-27 NOTE — CARE PLAN
The patient's goals for the shift include      The clinical goals for the shift include see care plan      Problem: General Stroke  Goal: Establish a mutual long term goal with patient by discharge  Flowsheets (Taken 7/27/2025 1522)  Establish a mutual long term goal with patient by discharge: Eat healthy     Problem: Pain - Adult  Goal: Verbalizes/displays adequate comfort level or baseline comfort level  Flowsheets (Taken 7/27/2025 1522)  Verbalizes/displays adequate comfort level or baseline comfort level: Encourage patient to monitor pain and request assistance     Problem: Safety - Adult  Goal: Free from fall injury  Flowsheets (Taken 7/27/2025 1522)  Free from fall injury: Instruct family/caregiver on patient safety     Problem: Discharge Planning  Goal: Discharge to home or other facility with appropriate resources  Flowsheets (Taken 7/27/2025 1522)  Discharge to home or other facility with appropriate resources: Identify barriers to discharge with patient and caregiver     Problem: Chronic Conditions and Co-morbidities  Goal: Patient's chronic conditions and co-morbidity symptoms are monitored and maintained or improved  Flowsheets (Taken 7/27/2025 1522)  Care Plan - Patient's Chronic Conditions and Co-Morbidity Symptoms are Monitored and Maintained or Improved: Monitor and assess patient's chronic conditions and comorbid symptoms for stability, deterioration, or improvement

## 2025-07-27 NOTE — ED TRIAGE NOTES
Pt presents to ED with wife with complaints of difficulty finding words and trouble reading. LKW 10 am this morning

## 2025-07-27 NOTE — CONSULTS
Subjective   HPI  Jose Gamez is a 80 y.o. Right-handed male with PMH below who presented to the hospital on 7/27/2025 with reported speech disturbances       Historian: Patient, Family, and ED Provider     Consult type/reason:  Critical Acute Stroke evaluation/Code Stroke        Last known well: same time as below. Episode happened while patient awake.     Time Stroke symptoms reported: Around 10:00 AM   Patient stated he w/up normal w/o complaints at 7:30 am. He was talking with his wife normally after. He then called his friend at 10 am and he noticed he was having difficulty saying few words and he did notice it and his wife noticed when he tried to read things he would say incorrect word before correcting him self w/ paraphasic errors like patterns. He stated symptoms continued and he got worried and thus they came to the ER. He stated as they were coming in he had his usual visual auras with flashing dots that he would have for his migraine. Currently stated feel back to normal.  He stated he wouldn't have to have migraine HA every time he has these auras.   He stated he sometimes might get the speech disturbances after the vision auras and he is not sure that those speech differences were not the same as the prior speech disturbances he would have with his migraine and he doesn't always have a headache w/ it. He stated he would get the vision auras then would take Fiorcet and after around an hour or 2 the vision ( and on occasion speech) would go away. He has those for years.  He is able to now to state the meds he takes as initially had difficulty in saying them w/ reported NIHSS 2 for aphasia when he first came in by ER staff.   He described no current HA, dizziness, vision/hearing changes aside from above, weakness, numbness or inability to walk. He feel speech back to normal now.         Past medical conditions mentioned: Hypertension, hyperlipidemia, coronary artery disease, history of headache  with aura occur around 3/year and has been going on for most of his life, history of colon cancer (in remission) > 5 years ago, thoracic aorta enlargement, classic migraine with visual aura.    Current antiplatelet/anticoagulation used: Aspirin and statin for coronary artery disease.    Prior functional status: Patient described as independent, able to perform normal activities of daily living without cognitive issues limiting function.      Pertinent negatives: No headache, no hearing changes, no dizziness, no fainting, no weakness, no numbness, no vision changes beyond typical migraine aura.      Had stroke symptoms resolved at time of presentation: Yes         Stroke Risk Factors:  Hypertension, Hyperlipidemia, H/O CAD, and Hx of migraine HA       Prior Functional Status (Modified Paz Scale):  0 The patient has no residual symptoms.    Time initial page :  11:24 AM    Time return Call : text message 11:27 am, return call 11:39 am    Time on Camera :  11:40 am    Virtual Visit start time:  as above        Time CT head reviewed: 11:31 am    Contraindication for thrombolytics such as history of hemorrhage, extracranial hemorrhage, MI or stroke in the last 3 months, intra-axial neoplasm, anticoagulation use or, other contraindication:  No     Patient BP:  153/77  Patient glucose: 122             Past Medical History  Medical History[1]  Surgical History  Surgical History[2]  Social History  Social History[3]  Allergies  Animal dander, Bee pollen, and Grass pollen    Home Medications  Current Outpatient Medications   Medication Instructions    amLODIPine (NORVASC) 10 mg, oral, Daily    aspirin 81 mg EC tablet 1 tablet, Daily    atorvastatin (LIPITOR) 20 mg, oral, Nightly    biotin 1 mg capsule 1 capsule, Daily    butalbital-acetaminophen-caff -40 mg tablet 1 tablet, oral, Every 8 hours    lisinopril 40 mg, oral, Daily    multivit-min-FA-lycopen-lutein (Centrum Silver Ultra Men's) 300-600-300 mcg tablet 1  "tablet, Daily           Objective   24h Vitals  Heart Rate:  [65]   Temperature:  [36.6 °C (97.9 °F)]   Respirations:  [16]   BP: (153)/(77)   Height:  [175.3 cm (5' 9\")]   Weight:  [79.4 kg (175 lb)]   Pulse Ox:  [96 %]      Physical Exam  Of note, certain features of exam limited due to telemedicine limitations, in-person exam would be helpful for subtle signs that can't be detected via telemedicine (neglect, vision field cut and vision neglect specifically).    Neurological Exam  Physical Exam     Alert    Oriented to self, place and time  No  aphasia or dysarthria present   Visual fields full b/l to the in-person staff present and on camera. No clear vision neglect present     gaze preference   Pupil appear symmetric per in-person assist to light and round/reactive  EOMI  No face asymmetry. Tongue midline   Motor and sensory testing done with the assistance of in person staff member as below   Able to move all limbs with full ROM without drift, 5/5. No clear pronator drift in arms.     Sensation intact b/l to light touch w/o neglect.  No clear nystagmus on camera. No incoordination on FTN/HTS b/l.   Able to stand up w/o assist.     NIHSS  Level of consciousness: 0 = Alert  LOC Question: 0 = Both correct  LOC Commands: 0 = Obeys both  Best Gaze: 0 = Normal  Visual Field: 0 = No visual loss  Facial Paresis: 0 = Normal  LUE: 0 = No drift; 10 sec  RUE: 0 = No drift; 10 sec  LLE: 0 = No drift; 5 sec  RLE: 0 = No drift; 5 sec  Dysarthria: 0 = Normal  Best Language: 0 = No aphasia  Limb Ataxia: 0 = Absent  Sensory: 0 = Absent  Neglect: 0 = None  NIHSS Score: 0 @ 11:56 am     Recent Labs        No lab exists for component: \"PHOSPHORUS\"              Lab Results   Component Value Date    HGBA1C 5.1 04/01/2025    HGBA1C 5.0 03/11/2024    HGBA1C 4.9 03/10/2023    LDLF 63 03/10/2023    LDLF 64 03/09/2022    LDLF 56 03/17/2021        Imaging  MRI head results: [unfilled]  CT head results: CT brain attack head wo IV " contrast  Result Date: 7/27/2025  Interpreted By:  Nazario Bardales, STUDY: CT BRAIN ATTACK HEAD WO IV CONTRAST;  7/27/2025 11:30 am   INDICATION: Signs/Symptoms:Stroke Evaluation.   COMPARISON: None.   ACCESSION NUMBER(S): [id number]   ORDERING CLINICIAN: DERRELL LEONARDO   TECHNIQUE: Sequential trans axial images were obtained  .   FINDINGS: INTRACRANIAL:   There is moderate prominence of the cortical sulci indicating atrophy.   There is moderate ventriculomegaly, again consistent with atrophy.   Mild decreased attenuation of the periventricular and long tracks of the white matter most consistent with gliosis from arterial disease. There is no evidence of definite subacute infarction, intracranial hemorrhage or mass.     EXTRACRANIAL: Visualized paranasal sinuses and mastoids are clear. The calvarium is intact.       Age related degenerative change as described without acute findings.   MACRO: Nazario Bardales discussed the significance and urgency of this critical finding by EPIC secure chat with  DERRELL LEONARDO on 7/27/2025 at 11:42 am.  (**-RCF-**) Findings:  See findings.   Signed by: Nazario Bardales 7/27/2025 11:42 AM Dictation workstation:   [id number]   Brain vessel imaging results: No brain vessel imaging results found for the past 12 months  Echocardiography results: Transthoracic echo (TTE) complete  Result Date: 8/21/2024   [facility], [address] [place] [address]             Tel [phone] and Fax [phone] TRANSTHORACIC ECHOCARDIOGRAM REPORT  Patient Name:      BUZZ Mendez Physician:    [id number] Guerra MD Study Date:        8/21/2024            Ordering Provider:    Maral GUERRA MRN/PID:           [id number]             Fellow: Accession#:        [id number]         Nurse: Date of Birth/Age: 1945 / 79 years Sonographer:           Yoel Liu                                                               [facility] Gender:            M                    Additional Staff: Height:            175.26 cm            Admit Date: Weight:            78.47 kg             Admission Status:     Outpatient BSA / BMI:         1.94 m2 / 25.55      Encounter#:           [id number]                    kg/m2 Blood Pressure:    137/66 mmHg          Department Location:  [facility] Study Type:    TRANSTHORACIC ECHO (TTE) COMPLETE Diagnosis/ICD: Ascending aorta dilatation-I77.810 Indication:    F/U on mild ascending aorta dilation CPT Code:      Echo Complete w Full Doppler-[id number] Patient History: Pertinent History: Mild ascending aorta dilation, CT calcium score 1054 2019,                    colon CA, DLD. Study Detail: The following Echo studies were performed: 2D, M-Mode, Doppler and               color flow.  PHYSICIAN INTERPRETATION: Left Ventricle: The left ventricular systolic function is normal, with a visually estimated ejection fraction of 55-60%. There are no regional left ventricular wall motion abnormalities. The left ventricular cavity size is normal. There is no evidence of left ventricular hypertrophy. Spectral Doppler shows an impaired relaxation pattern of left ventricular diastolic filling. Left Atrium: The left atrium is normal in size. Right Ventricle: The right ventricle is normal in size. There is normal right ventricular global systolic function. Right Atrium: The right atrium is normal in size. Aortic Valve: The aortic valve is trileaflet. The aortic valve dimensionless index is 0.76. There is trace aortic valve regurgitation. The peak instantaneous gradient of the aortic valve is 6.7 mmHg. The mean gradient of the aortic valve is 4.2 mmHg. Mitral Valve: The mitral valve is normal in structure. There is trace mitral valve regurgitation. Tricuspid Valve: The tricuspid valve is structurally normal. There is trace tricuspid  regurgitation. Pulmonic Valve: The pulmonic valve is structurally normal. There is physiologic pulmonic valve regurgitation. Pericardium: There is a trivial pericardial effusion. Aorta: The aortic root is abnormal. The Asc Ao is 4.10 cm. There is mild dilatation of the ascending aorta. There is mild dilatation of the aortic root. Pulmonary Artery: The tricuspid regurgitant velocity is 1.72 m/s, and with an estimated right atrial pressure of 3 mmHg, the estimated pulmonary artery pressure is normal with the RVSP at 14.8 mmHg. Systemic Veins: The inferior vena cava appears to be of normal size. There is IVC inspiratory collapse greater than 50%. In comparison to the previous echocardiogram(s): Compared with study dated 8/23/2023, no significant change. Aorta measures unchanged since last exam (root 3.8 cm / proximal ascending 4.1 cm) compared to 8/23/2023 exam.  CONCLUSIONS:  1. The left ventricular systolic function is normal, with a visually estimated ejection fraction of 55-60%.  2. Spectral Doppler shows an impaired relaxation pattern of left ventricular diastolic filling.  3. There is no evidence of left ventricular hypertrophy.  4. There is normal right ventricular global systolic function. QUANTITATIVE DATA SUMMARY: 2D MEASUREMENTS:                        Normal Ranges: Ao Root d:     3.90 cm (2.0-3.7cm) LAs:           3.49 cm (2.7-4.0cm) RVIDd:         2.97 cm (0.9-3.6cm) IVSd:          1.00 cm (0.6-1.1cm) LVPWd:         0.90 cm (0.6-1.1cm) LVIDd:         5.10 cm (3.9-5.9cm) LVIDs:         3.20 cm LV Mass Index: 90 g/m2 LV % FS        37.3 % LA VOLUME:                               Normal Ranges: LA Vol A4C:        45.9 ml    (22+/-6mL/m2) LA Vol A2C:        70.7 ml LA Vol BP:         57.0 ml LA Vol Index A4C:  23.6ml/m2 LA Vol Index A2C:  36.4 ml/m2 LA Vol Index BP:   29.3 ml/m2 LA Area A4C:       16.6 cm2 LA Area A2C:       20.6 cm2 LA Major Axis A4C: 5.1 cm LA Major Axis A2C: 5.1 cm LA Volume Index:   29.5  ml/m2 LA Vol A4C:        41.1 ml LA Vol A2C:        68.1 ml LA Vol Index BSA:  28.1 ml/m2 RA VOLUME BY A/L METHOD:                       Normal Ranges: RA Area A4C: 17.9 cm2 M-MODE MEASUREMENTS:                  Normal Ranges: Ao Root: 3.80 cm (2.0-3.7cm) LAs:     4.45 cm (2.7-4.0cm) AORTA MEASUREMENTS:                    Normal Ranges: Asc Ao, d: 4.10 cm (2.1-3.4cm) LV SYSTOLIC FUNCTION BY 2D PLANIMETRY (MOD):                      Normal Ranges: EF-A4C View:    52 % (>=55%) EF-A2C View:    58 % EF-Visual:      58 % EF-Auto         56 % LV EF Reported: 58 % LV DIASTOLIC FUNCTION:                               Normal Ranges: MV Peak E:        0.61 m/s    (0.7-1.2 m/s) MV Peak A:        0.79 m/s    (0.42-0.7 m/s) E/A Ratio:        0.77        (1.0-2.2) MV e'             0.085 m/s   (>8.0) MV lateral e'     0.10 m/s MV medial e'      0.07 m/s MV A Dur:         115.34 msec E/e' Ratio:       7.16        (<8.0) a'                0.08 m/s PulmV Sys Khai:    59.64 cm/s PulmV Cooper Khai:   31.33 cm/s PulmV S/D Khai:    1.90 PulmV A Revs Khai: 28.53 cm/s PulmV A Revs Dur: 76.12 msec MITRAL VALVE:                 Normal Ranges: MV DT: 297 msec (150-240msec) AORTIC VALVE:                                   Normal Ranges: AoV Vmax:                1.29 m/s (<=1.7m/s) AoV Peak P.7 mmHg (<20mmHg) AoV Mean P.2 mmHg (1.7-11.5mmHg) LVOT Max Khai:            0.96 m/s (<=1.1m/s) AoV VTI:                 26.11 cm (18-25cm) LVOT VTI:                19.91 cm LVOT Diameter:           2.30 cm  (1.8-2.4cm) AoV Area, VTI:           3.16 cm2 (2.5-5.5cm2) AoV Area,Vmax:           3.08 cm2 (2.5-4.5cm2) AoV Dimensionless Index: 0.76 AORTIC INSUFFICIENCY: AI Vmax:       3.32 m/s AI Half-time:  821 msec AI Decel Time: 2831 msec AI Decel Rate: 118.09 cm/s2  RIGHT VENTRICLE: RV Basal 3.60 cm RV Mid   3.00 cm RV Major 6.4 cm TAPSE:   35.0 mm RV s'    0.11 m/s TRICUSPID VALVE/RVSP:                             Normal Ranges: Peak  TR Velocity: 1.72 m/s RV Syst Pressure: 14.8 mmHg (< 30mmHg) IVC Diam:         1.50 cm PULMONIC VALVE:                         Normal Ranges: PV Accel Time: 122 msec (>120ms) PV Max Khai:    0.9 m/s  (0.6-0.9m/s) PV Max PG:     3.3 mmHg Pulmonary Veins: PulmV A Revs Dur: 76.12 msec PulmV A Revs Khai: 28.53 cm/s PulmV Cooper Khai:   31.33 cm/s PulmV S/D Khai:    1.90 PulmV Sys Khai:    59.64 cm/s AORTA: Asc Ao Diam 3.94 cm  [id number] Juancarlos Contreras MD Electronically signed on 8/21/2024 at 10:38:19 AM  ** Final **       I have personally reviewed the following images and agree with the formal interpretation with the following edits/additions.    CT head without contrast:   My independent review:  CTH imaging personally reviewed, showed no acute ischemic / hemorrhagic changes   No Prior scans to compare   No clear White matter changes.  No clear acute hypodensity seen  No acute hyper density seen.  No encephalomalacia / midline shift / hydro present.        CTA Head and Neck Imaging:  CTA head and neck imaging personally reviewed, no large vessel occlusion or severe stenosis seen reported asymmetry on R MCA branches highly suspect artifact rather than true anomalies.         Official report: Reviewed for above    Assessment:     Assessment/Plan   80 y.o. male with reported acute speech disturbances/aphasia followed by visual auras unclear if similar to his speech disturbances from his usual migraine or not admission for r/o acute ischemic stroke reasonable.     Neurologic Manifestations: as above  Deficits: NIHSS as above      Diagnosis:  Concern stroke vs TIA vs possible speech disturbances from migraine aura    Thrombolytic candidate: did discuss w/ patient and he stated he feels back to normal and wouldn't want the medication after discussion regarding risk and benefits along w/ AHA recommendations.     IV Thrombolysis IV Thrombolysis Checklist        IV Thrombolysis Given: No; Thrombolysis contraindication reason: Neurologic  signs have spontaneously resolved           Patient is a candidate for thrombectomy:  yes/no: No; contraindication reason: No evidence of proximal occlusion and NIHSS <6      Disposition:  Patient will remain at referring facility for further evaluation and management.      If pending vascular imaging, please notify with results, follow hospital protocol.        Initial treatment: Plavix Load and Aspirin Load  Anti-platelets or Anti-coagulation management: Aspirin and Plavix  Vascular Risk Factors: as above        Recommendations:  Wife stated she will try to recall whether similar things happened in the past, he can't confidently say that this speech disturbances was different than prior speech issues he had when he have a migraine aura( prior would have vision then speech).  Given TIA can't be completely ruled out vs new ischemic stroke reasonable to undergo further w/up.   Would place on telemetry. Ensure no arrhythmia on EKG ( Please obtain if not done)  If patient on Anticoagulation, hold anticoagulation at least for the first 24-48 hours ( use antiplatelet instead) till further imaging obtained ( preferably MRI) with decision to restart anticoagulation depends on imaging findings  Load with 300mg Plavix now, then start 75mg daily tomorrow, Load with 325mg ASA now, then start 81mg daily starting tomorrow, and Per [facility] protocol- DAPT (ASA & Plavix) for 21 days then ASA monotherapy indefinitely   Statin high intensity: Atorvastatin 40 at bedtime for example is a good option.   MRI brain without contrast Recommended. Vessel imaging: Recommend to be obtained if not done already  Echocardiogram: Recommended  Stroke labs mainly A1c/lipid panel for now.  CBC/ESR/CRP/CMP/TSH if not done already.    Further labs depending on patient overall presentation and history and whether MRI would be normal vs abnormal.  MRI Brain w/o contrast stroke protocol or repeat CTH 24 hours after initial CTH if unable to get MRI.  EKG,  troponin.  TTE w/ bubble study.  Lipid panel, A1c.  Consider focused stroke order set.  Q.4 hour neurochecks.  This can be changed to Every shift after 72 hours of admission if patient is clinically stable without fluctuation or large vessel occlusion on imaging.  NIHSS daily at least for the first 5 days and at the day of discharge (of course this is if the patient remained admitted until then)  For BP: avoid hypotension SBP <100 that could worsen cerebral perfusion. Ischemic stroke- early permissive hypertension SBP < 220 mmHg with cautious inpatient lowering.. Permissive hypertension for 24 hours, keep SBP <220, DBP <120.  Hold home BP meds for the time being.  If CTA with symptomatic stenosis/occlusion, consider head of bed flat for 24 hour if can tolerate.   Slow titration of blood pressure preferred, an easy strategy can aim to decrease BP by 20 point SBP/10 DBP ( Around 15% lowering per day) till reaching home BP goal < 130/80 is recommended unless other values ( from other medical condition) indicated.   If possible, would avoid hydralazine for the first week post stroke.  Other options can be tried such as PRN Labetalol or Enalaprilat, IV drips, Captopril, Nifedipine, or if patient is a good candidate Clonidine.  If diabetic, mainly would avoid hypoglycemia. Glucose Goals: early treatment of hyperglycemia to goal glucose 140-180 mg/dl during inpatient stay, with long-term goal A1c < 7%.  If no pending for procedure, can start a diet if passes swallow, NPO until nurse bedside swallow assessment otherwise. AHA diet recommended if not indicated otherwise for other medical condition  Okay with gentle hydration from my end.  SQ Heparin/Lovenox SQ for DVT prophylaxis.  Fall precautions.   Assessment for Rehabilitation needs ( PT/OT/ST).  Establish care w/ neurology clinic to discuss alternative abortive options better than Fioricet.         Vascular Risk Factor modification goals:  Stroke risk factor goals as  outpatient: A1c <7.0, -130/<80, BMI less than 24, LDL <70, Triglycerides <135.  If smoker/E-Cigarette /nicotine user: Smoking Cessation and Education recommended  Assessment for Rehabilitation needs including PT/OT and if indicated swallow evaluation and speech therapy   If initial LDL on admission >70, it would be reasonable to recheck lipid panel in 2-3 months and add Ezetimibe to reach LDL goal <70.  Would be reasonable to try Vascepa as outpatient if initial triglyceride level elevated >135 and continue to be elevated despite being on statin.  If not done prior checking Lipoprotein (a) once in a lifetime is reasonable as outpatient.   Consideration of screening for obstructive sleep apnea after ischemic stroke/TIA as an outpatient is generally recommended.  Low threshold to screen for depression/anxiety (screening tools such as PHQ-9, DAR or aphasia depression rating scale) are recommended for patients poststroke in the outpatient settings.  Cognitive evaluation as an outpatient (at least within 1 year) is recommended for stroke.  Consideration for metformin is reasonable and ischemic stroke/TIA if the following is met: Prediabetes and <60 years of age or with BMI >34 or women with history of gestational diabetes.  For DM management in patient with ischemic stroke as outpatient: Can consider GLP-1 receptor agonist preferably or SGLT-2 inhibitors (preferably Dulaglutide if possible and no concern for potential heart/kidney failure)  Patient and family education on signs and symptoms of stroke, calling 911, risk factors, and healthy strategies for stroke prevention.          Please call back if Neurological changes occur         The plan was discussed with the requesting provider/primary staff.  Of note, Evaluation limited due to observational assessment of telemedicine evaluation. In-person exam may be helpful for more subtle signs that cannot be detected via telemedicine and recommended to ensure absence  of these findings.     I attest that I introduced myself to the patient, provided my credentials, and disclosed my location. Based on evaluation and/or discussion with members of the patient's treatment team, a telemedicine appear to be an appropriate and effective mean of providing the service.     Virtual or Telephone Consent  An interactive audio and video telecommunication system which permits real time communications between the patient (at the originating site) and provider (at the distant site) was utilized to provide this telehealth service.   Verbal consent was requested and obtained from Jose Gamez on this date, 07/27/25 for a telehealth visit.      The patient with reported acute symptoms suspecting acute neurological symptoms/deficits  requiring emergent evaluation and management for potential acute stroke that initially thought to have high probability of life-threatening deterioration requiring my continuous attention and high-complexity medical decision-making. Critical care time spent - 60 minutes for Evaluation and management of this condition along with the need for emergent interpretation of diagnostic studies necessary for immediate decision-making, coordination of multidisciplinary care, including discussions with ED, other care teams and family           Daniel Duke MD             [1]   Past Medical History:  Diagnosis Date    Abnormal electrocardiogram (ECG) (EKG)     Abnormal EKG    Hyperlipidemia, unspecified 11/03/2013    Hyperlipidemia    Malignant neoplasm of colon, unspecified 03/23/2021    Colon cancer    Other abnormal glucose 01/12/2016    Blood glucose abnormal    Other specified postprocedural states     H/O colonoscopy    Personal history of other diseases of male genital organs     History of erectile dysfunction    Personal history of other diseases of the musculoskeletal system and connective tissue     Personal history of arthritis    Personal history of other  diseases of the nervous system and sense organs     History of cataract    Personal history of other diseases of the nervous system and sense organs     History of migraine    Personal history of other malignant neoplasm of large intestine     History of malignant neoplasm of colon    Personal history of other malignant neoplasm of skin     History of basal cell carcinoma (BCC)    Polyp of colon     Benign colon polyp   [2]   Past Surgical History:  Procedure Laterality Date    OTHER SURGICAL HISTORY  12/18/2013    Surgery Of The Eyelids    OTHER SURGICAL HISTORY  07/30/2019    Excision of basal cell carcinoma    OTHER SURGICAL HISTORY  07/30/2019    Cataract surgery    OTHER SURGICAL HISTORY  07/30/2019    Vasectomy    TONSILLECTOMY  12/18/2013    Tonsillectomy    VASECTOMY  12/18/2013    Surgery Vas Deferens Vasectomy   [3]   Social History  Tobacco Use    Smoking status: Never    Smokeless tobacco: Never   Vaping Use    Vaping status: Never Used   Substance Use Topics    Alcohol use: Yes     Alcohol/week: 14.0 standard drinks of alcohol     Types: 14 Shots of liquor per week    Drug use: Never

## 2025-07-27 NOTE — ED PROVIDER NOTES
"HPI   Chief Complaint   Patient presents with    Stroke     lkw       HPI    Jose Gamez is an 80-year-old male with history of hypertension, CAD presenting to the ED with difficulty with speech.  Patient states at 10 AM this morning he began having difficulty finding the words to say.  Patient stated an example would be he could not find the word \"migraine.\"  Patient states he also has noticed changes in his speech that started 10 AM this morning.  Patient denies falls and injuries to the head.  Patient states he does not take blood thinners.  Patient denies head pain, chest pain, shortness of breath, abdominal pain, nausea, vomiting, fevers, body aches, chills.    Patient History   Medical History[1]  Surgical History[2]  Family History[3]  Social History[4]    Physical Exam   ED Triage Vitals [07/27/25 1109]   Temperature Heart Rate Respirations BP   36.6 °C (97.9 °F) 65 16 153/77      Pulse Ox Temp src Heart Rate Source Patient Position   96 % -- Monitor --      BP Location FiO2 (%)     -- --       Physical Exam  Vitals and nursing note reviewed.   Constitutional:       Appearance: Normal appearance.     Cardiovascular:      Rate and Rhythm: Normal rate and regular rhythm.      Heart sounds: Normal heart sounds. No murmur heard.     No gallop.   Pulmonary:      Effort: Pulmonary effort is normal. No respiratory distress.      Breath sounds: Normal breath sounds. No stridor. No wheezing, rhonchi or rales.     Neurological:      Mental Status: He is alert.      Cranial Nerves: Dysarthria present. No facial asymmetry.      Sensory: Sensation is intact.      Motor: Motor function is intact. No weakness, tremor or pronator drift.      Coordination: Coordination is intact. Coordination normal. Finger-Nose-Finger Test and Heel to Shin Test normal. Rapid alternating movements normal.      Gait: Gait is intact.      Comments: Patient has aphasia   Psychiatric:         Mood and Affect: Mood normal.         " Behavior: Behavior normal.           ED Course & MDM   ED Course as of 07/27/25 1605   Sun Jul 27, 2025   1211 CT brain attack angio head and neck W and WO IV contrast  Mild paucity of right MCA cortical branches as compared to left side.  This may reflect an anatomical variant versus diminished flow within  right MCA territory. Clinical correlation is recommended.      Otherwise, no evidence of major vessel cut off or hemodynamically  significant stenosis on CT angiogram of head and neck.  Dr. Duke from neurostroke stated no acute intervention needed based on findings of CTA.  Dr. Duke stated the mild paucity of right MCA cortical branches is most likely artifact due to the patient's age. [AD]   1212 CT brain attack head wo IV contrast  Age related degenerative change as described without acute findings. [AD]   1212 Dr. Duke from [facility]stroke evaluated the patient after imaging was obtained.  Dr. Duke stated the patient's NIH is 0 on his exam and his symptoms improved.  Dr. Duke recommended not giving TNK.  Dr. Duke did recommend giving the patient 300mg plavix and 325mg aspirin now and then 75mg plavix and 81mg aspirin until stroke MRI brain and stroke workup is complete.  [AD]   1234 EKG personally interpreted by myself.  Obtained at 1152 hrs.  Sinus rhythm with a ventricular rate of 75 bpm.  QRS of 124 ms.  QTc 426 ms.  Left axis deviation noted.  No STEMI. [NW]   1346 SODIUM(!): 125  1 L IV normal saline ordered [AD]      ED Course User Index  [AD] Cuco Atkinson PA-C  [NW] Gian Mckay,          Diagnoses as of 07/27/25 1605   Hyponatremia                 No data recorded     [place] Coma Scale Score: 15 (07/27/25 1110 : Cleo Jiang RN)                           Medical Decision Making    This is an 80-year-old male presenting the ED with difficulty with speech.  Patient states at 10 AM this morning he began having difficulty finding words to say.  Last known well is a little before 10  AM this morning.  Reported her glucose was 122 in triage.  I evaluated the patient in triage for a stroke assessment.  Patient was reading a sentence and was having difficulty finding the words to say in the sentence.  Patient also had slightly slurred speech in triage.  NIH was 2 in triage.  Given the patient's symptoms as well as aphasia and dysarthria in triage a brain attack was initiated.  CT head and CTA head and neck were obtained for stroke evaluation.  Coags, troponin, CMP, CBC were also obtained.  Troponin within normal limits.  CBC shows leukopenia with WBC 3.9.  Coags are unremarkable.  CMP shows hyponatremia with sodium of 125.  Patient was given 1 L IV normal saline for hyponatremia.  Results of CT head and CTA head and neck can be found in ED course of this note. CTA shows mild paucity of right MCA cortical branches as compared to left side.  Dr. Duke from neurostroke stated no acute intervention needed based on results of CTA and findings most likely due to patient's age.  Dr. Duke recommended not giving TNK since patient was NIH 0 on his exam.   Dr. Duke recommended giving the patient 300 mg Plavix and arrange 25 mg aspirin therefore the meds were given.  Patient will need to be admitted for further stroke workup including MRI brain.  Patient has been hemodynamically stable in the emergency department today.    Disposition: Hospital admission  Patient is admitted to medicine under Dr. Villanueva.  Patient agrees to hospital mission at this time.    Patient was discussed and staffed with Dr. Mckay  Procedure  Procedures         [1]   Past Medical History:  Diagnosis Date    Abnormal electrocardiogram (ECG) (EKG)     Abnormal EKG    Hyperlipidemia, unspecified 11/03/2013    Hyperlipidemia    Malignant neoplasm of colon, unspecified 03/23/2021    Colon cancer    Other abnormal glucose 01/12/2016    Blood glucose abnormal    Other specified postprocedural states     H/O colonoscopy    Personal history of  other diseases of male genital organs     History of erectile dysfunction    Personal history of other diseases of the musculoskeletal system and connective tissue     Personal history of arthritis    Personal history of other diseases of the nervous system and sense organs     History of cataract    Personal history of other diseases of the nervous system and sense organs     History of migraine    Personal history of other malignant neoplasm of large intestine     History of malignant neoplasm of colon    Personal history of other malignant neoplasm of skin     History of basal cell carcinoma (BCC)    Polyp of colon     Benign colon polyp   [2]   Past Surgical History:  Procedure Laterality Date    OTHER SURGICAL HISTORY  12/18/2013    Surgery Of The Eyelids    OTHER SURGICAL HISTORY  07/30/2019    Excision of basal cell carcinoma    OTHER SURGICAL HISTORY  07/30/2019    Cataract surgery    OTHER SURGICAL HISTORY  07/30/2019    Vasectomy    TONSILLECTOMY  12/18/2013    Tonsillectomy    VASECTOMY  12/18/2013    Surgery Vas Deferens Vasectomy   [3]   Family History  Problem Relation Name Age of Onset    Arthritis Mother      COPD Mother      Fibromyalgia Mother      Asthma Father     [4]   Social History  Tobacco Use    Smoking status: Never    Smokeless tobacco: Never   Vaping Use    Vaping status: Never Used   Substance Use Topics    Alcohol use: Yes     Alcohol/week: 14.0 standard drinks of alcohol     Types: 14 Shots of liquor per week    Drug use: Never        Cuco Atkinson PA-C  07/27/25 2041

## 2025-07-27 NOTE — H&P
History Of Present Illness  Jose Gamez is a 80 y.o. male presenting with dysarthria.  Patient states he was at his usual state of health this morning.  He has a friend who is currently hospitalized, and he was trying to call him and take some notes.  While on the phone this morning, he felt he was having some difficulty putting words out while on the phone.  Patient states he feels he understood everything, however could not speak the appropriate word.  He also states that he knows his daily medication, however also had difficulty speaking all the medication during that..  His father previously had a stroke, wife was concerned about similar symptoms, therefore decided to bring him to the hospital for further evaluation.  The patient denies any weakness, vision difficulty, nausea vomiting.  Denies any recent illness including fever, chills, shortness of breath, chest pain, abdominal pain, diarrhea.  No recent change in medication or travel history.  Of note, patient does state he has history of migraine, and he does take as needed medication for migraine attacks.  He describes his migraine attacks as blurred vision/vision difficulties with aura.  Usually when he takes his medication after the onset of the symptoms it would go away in the next hour or so.  Patient also states previously after the death attack, he would occasionally have issues with speaking or word finding for short period time.  When asked what is different this time, he states he has not had his previous aura/vision difficulties preceding the event, therefore he felt this was different.    While in the emergency room, his vitals has been stable.  CBC did not show any acute findings.  BMP showed new hyponatremia with sodium of 125.  His baseline of 133.  Otherwise labs did not show any acute abnormalities.  Per ED report, at the time of evaluation, he had NIHSS score of 2 with dysarthria, therefore brain attack was called.  CT scan showed  mild paucity of the right MCA cortical branches compared to the left, this may be a anatomical variant versus diminished flow.  No evidence of major vessel cutoff or hemodynamic significant stenosis on CTA head and neck.  Telestroke was contacted, recommend patient to be admitted for further neurological workup.  In the interim in the emergency room, patient symptoms completely resolved, therefore no TNK was recommended.    Patient is full code     Past Medical History  He has a past medical history of Abnormal electrocardiogram (ECG) (EKG), Hyperlipidemia, unspecified (11/03/2013), Malignant neoplasm of colon, unspecified (03/23/2021), Other abnormal glucose (01/12/2016), Other specified postprocedural states, Personal history of other diseases of male genital organs, Personal history of other diseases of the musculoskeletal system and connective tissue, Personal history of other diseases of the nervous system and sense organs, Personal history of other diseases of the nervous system and sense organs, Personal history of other malignant neoplasm of large intestine, Personal history of other malignant neoplasm of skin, and Polyp of colon.    Surgical History  He has a past surgical history that includes Other surgical history (12/18/2013); Vasectomy (12/18/2013); Tonsillectomy (12/18/2013); Other surgical history (07/30/2019); Other surgical history (07/30/2019); and Other surgical history (07/30/2019).     Social History  He reports that he has never smoked. He has never used smokeless tobacco. He reports current alcohol use of about 14.0 standard drinks of alcohol per week. He reports that he does not use drugs.    Family History  Family History[1]     Allergies  Animal dander, Bee pollen, and Grass pollen    Review of Systems   ROS: 12 systems reviewed and negative except per HPI above     Physical Exam by System:     Constitutional: Well developed, awake/alert/oriented x3, no distress, alert and cooperative  "  ENMT: mucous membranes moist   Head/Neck: Neck supple   Respiratory/Thorax: Patent airways, CTAB, normal breath sounds with good chest expansion, thorax symmetric   Cardiovascular: Regular, rate and rhythm, no murmurs, 2+ equal pulses of the extremities, normal S 1and S 2   Gastrointestinal: Nondistended, soft, non-tender, no rebound tenderness or guarding, no masses palpable, no organomegaly, +BS, no bruits   Musculoskeletal: ROM intact, no joint swelling, normal strength   Extremities: 1+ pitting edema b/l lower extremities   Neurological: alert and oriented x3, intact senses, motor, response and reflexes, normal strength       Last Recorded Vitals  Blood pressure 151/81, pulse 68, temperature 36.6 °C (97.9 °F), resp. rate 16, height 1.753 m (5' 9\"), weight 79.4 kg (175 lb), SpO2 98%.    Relevant Results  Imaging  CT brain attack angio head and neck W and WO IV contrast  Result Date: 7/27/2025  Mild paucity of right MCA cortical branches as compared to left side. This may reflect an anatomical variant versus diminished flow within right MCA territory. Clinical correlation is recommended.   Otherwise, no evidence of major vessel cut off or hemodynamically significant stenosis on CT angiogram of head and neck.   MACRO: Jessica Stokes discussed the significance and urgency of this critical finding by EP[employer] secure chat with  Gian Mckay on 7/27/2025 at 11:52 am.  (**-RCF-**) Findings:  See findings.   Signed by: Jessica Stokes 7/27/2025 11:52 AM Dictation workstation:   [id number]    CT brain attack head wo IV contrast  Result Date: 7/27/2025  Age related degenerative change as described without acute findings.   MACRO: Nazario Bardales discussed the significance and urgency of this critical finding by [employer] chat with  DERRELL LEONARDO on 7/27/2025 at 11:42 am.  (**-RCF-**) Findings:  See findings.   Signed by: Nazario Bardales 7/27/2025 11:42 AM Dictation workstation:   [id number]      Cardiology, Vascular, and " Other Imaging  No other imaging results found for the past 7 days     Results for orders placed or performed during the hospital encounter of 07/27/25 (from the past 24 hours)   POCT GLUCOSE   Result Value Ref Range    POCT Glucose 122 (H) 74 - 99 mg/dL   CBC and Auto Differential   Result Value Ref Range    WBC 3.9 (L) 4.4 - 11.3 x10*3/uL    nRBC 0.0 0.0 - 0.0 /100 WBCs    RBC 4.20 (L) 4.50 - 5.90 x10*6/uL    Hemoglobin 13.9 13.5 - 17.5 g/dL    Hematocrit 40.8 (L) 41.0 - 52.0 %    MCV 97 80 - 100 fL    MCH 33.1 26.0 - 34.0 pg    MCHC 34.1 32.0 - 36.0 g/dL    RDW 11.5 11.5 - 14.5 %    Platelets 219 150 - 450 x10*3/uL    Neutrophils % 39.7 40.0 - 80.0 %    Immature Granulocytes %, Automated 0.3 0.0 - 0.9 %    Lymphocytes % 44.2 13.0 - 44.0 %    Monocytes % 11.7 2.0 - 10.0 %    Eosinophils % 3.3 0.0 - 6.0 %    Basophils % 0.8 0.0 - 2.0 %    Neutrophils Absolute 1.57 (L) 1.60 - 5.50 x10*3/uL    Immature Granulocytes Absolute, Automated 0.01 0.00 - 0.50 x10*3/uL    Lymphocytes Absolute 1.74 0.80 - 3.00 x10*3/uL    Monocytes Absolute 0.46 0.05 - 0.80 x10*3/uL    Eosinophils Absolute 0.13 0.00 - 0.40 x10*3/uL    Basophils Absolute 0.03 0.00 - 0.10 x10*3/uL   Comprehensive metabolic panel   Result Value Ref Range    Glucose 126 (H) 74 - 99 mg/dL    Sodium 125 (L) 136 - 145 mmol/L    Potassium 4.1 3.5 - 5.3 mmol/L    Chloride 94 (L) 98 - 107 mmol/L    Bicarbonate 23 21 - 32 mmol/L    Anion Gap 12 mmol/L    Urea Nitrogen 12 6 - 23 mg/dL    Creatinine 0.76 0.50 - 1.30 mg/dL    eGFR 90 >60 mL/min/1.73m*2    Calcium 8.5 (L) 8.6 - 10.3 mg/dL    Albumin 4.2 3.4 - 5.0 g/dL    Alkaline Phosphatase 56 33 - 136 U/L    Total Protein 6.3 (L) 6.4 - 8.2 g/dL    AST 29 9 - 39 U/L    Bilirubin, Total 0.7 0.0 - 1.2 mg/dL    ALT 23 10 - 52 U/L   Troponin I, High Sensitivity   Result Value Ref Range    Troponin I, High Sensitivity 4 0 - 20 ng/L   Protime-INR   Result Value Ref Range    Protime 11.6 9.8 - 12.4 seconds    INR 1.1 0.9 - 1.1    APTT   Result Value Ref Range    aPTT 28 26 - 36 seconds        Scheduled medications  Scheduled Medications[2]  Continuous medications  Continuous Medications[3]  PRN medications  PRN Medications[4]          Assessment/Plan   Assessment & Plan  TIA (transient ischemic attack)    Hypercholesteremia    Hypertension    Migraine aura without headache    Hyponatremia      Plan  - Initially presented with dysarthria, NIH SS of 2, symptoms completely resolved at this time with NIHSS of 0  -CT head showing mild paucity of the right MCA cortical branches, possible anatomical variant.  CTA did not show any acute occlusion or stenosis.-  -Patient does have history of migraine attacks, unclear if this is a complex migraine attack at this time  -Neurology team consulted, appreciate recommendation  -Will obtain MRI of the brain to rule out stroke  -Continue telemetry monitor  - Every 4 hours neurocheck  -Echo ordered for stroke workup  - Most recent A1c on 4/1/25 was 5.1, LDL was 54, will not repeat  -Continue aspirin/statin   -Continue home antihypertensives  -Etiology for hyponatremia is unclear at this time as patient denied any GI loss nor decreased intake, urine electrolytes and serum osmolality ordered  -Normal saline bolus ordered from the emergency room, will repeat BMP this evening and tomorrow, if improved would recommend outpatient follow-up  -DVT prophylaxis with subcu Lovenox  -Patient is full code         Complexity: High    This dictation was created using voice recognition software.  If there are any questions about inaccuracies please do not hesitate to contact me.      SIGNATURE: Terell Villanueva MD PATIENT NAME: Jose Gamez   DATE: July 27, 2025 MRN: [id number]   TIME: 1:59 PM             [1]   Family History  Problem Relation Name Age of Onset    Arthritis Mother      COPD Mother      Fibromyalgia Mother      Asthma Father     [2] sodium chloride, 1,000 mL, intravenous, Once    [3]    [4] PRN medications:  hydrALAZINE, labetaloL

## 2025-07-27 NOTE — ED NOTES
1119 Stroke alert one push activation   Emergency overhead     Nereida Montoya, EMT  07/27/25 1127

## 2025-07-27 NOTE — PROGRESS NOTES
PHARMACY STROKE RESPONSE      Patient Name: Jose Gamez  MRN: [id number]  Location: FT07/FT07    Patient Weight (kg):   Wt Readings from Last 1 Encounters:   07/27/25 79.4 kg (175 lb)        An acute Brain Attack has been activated, pharmacy participated in multidisciplinary team bedside response for Jose Gamez.  Contraindications for fibrinolytic therapy have been reviewed by pharmacy and any issues relating to medication therapy have been discussed directly with the provider(s) caring for this patient.     Pharmacy aided in the bedside response for fibrinolytic therapy. Patient did not receive fibrinolysis.    Orders Placed This Encounter      aspirin tablet 325 mg      clopidogrel (Plavix) tablet 300 mg      hydrALAZINE (Apresoline) injection 10 mg      iohexol (OMNIPaque) 350 mg iodine/mL solution 70 mL      labetaloL (Normodyne,Trandate) injection 10 mg      Thank you for allowing me to take part in the care of this patient.     NI ANGULO  7/27/2025  12:14 PM         References:    Neurological [place] Stroke Tools   Neurological [place] IV Thrombolysis Checklist

## 2025-07-27 NOTE — NURSING NOTE
Pt calm and cooperative through shift. Pt had no neuro changes and tolerated diet. Pt rested through shift and ambulated in room.

## 2025-07-28 ENCOUNTER — APPOINTMENT (OUTPATIENT)
Dept: RADIOLOGY | Facility: HOSPITAL | Age: 80
DRG: 069 | End: 2025-07-28
Payer: MEDICARE

## 2025-07-28 ENCOUNTER — APPOINTMENT (OUTPATIENT)
Dept: CARDIOLOGY | Facility: HOSPITAL | Age: 80
DRG: 069 | End: 2025-07-28
Payer: MEDICARE

## 2025-07-28 ENCOUNTER — PHARMACY VISIT (OUTPATIENT)
Dept: PHARMACY | Facility: CLINIC | Age: 80
End: 2025-07-28
Payer: COMMERCIAL

## 2025-07-28 VITALS
RESPIRATION RATE: 16 BRPM | WEIGHT: 175 LBS | DIASTOLIC BLOOD PRESSURE: 71 MMHG | BODY MASS INDEX: 25.92 KG/M2 | OXYGEN SATURATION: 99 % | TEMPERATURE: 97.5 F | SYSTOLIC BLOOD PRESSURE: 107 MMHG | HEIGHT: 69 IN | HEART RATE: 61 BPM

## 2025-07-28 PROBLEM — G43.109 MIGRAINE AURA WITHOUT HEADACHE: Status: RESOLVED | Noted: 2025-07-27 | Resolved: 2025-07-28

## 2025-07-28 LAB
ANION GAP SERPL CALC-SCNC: 11 MMOL/L (ref 10–20)
AORTIC VALVE MEAN GRADIENT: 4 MMHG
AORTIC VALVE PEAK VELOCITY: 1.31 M/S
AV PEAK GRADIENT: 7 MMHG
AVA (PEAK VEL): 2.86 CM2
AVA (VTI): 2.67 CM2
BUN SERPL-MCNC: 10 MG/DL (ref 6–23)
CALCIUM SERPL-MCNC: 9.1 MG/DL (ref 8.6–10.3)
CHLORIDE SERPL-SCNC: 99 MMOL/L (ref 98–107)
CO2 SERPL-SCNC: 24 MMOL/L (ref 21–32)
CREAT SERPL-MCNC: 0.76 MG/DL (ref 0.5–1.3)
EGFRCR SERPLBLD CKD-EPI 2021: 90 ML/MIN/1.73M*2
EJECTION FRACTION APICAL 4 CHAMBER: 59.2
EJECTION FRACTION: 58 %
ERYTHROCYTE [DISTWIDTH] IN BLOOD BY AUTOMATED COUNT: 11.6 % (ref 11.5–14.5)
GLUCOSE SERPL-MCNC: 138 MG/DL (ref 74–99)
HCT VFR BLD AUTO: 41.5 % (ref 41–52)
HGB BLD-MCNC: 14.6 G/DL (ref 13.5–17.5)
LEFT ATRIUM VOLUME AREA LENGTH INDEX BSA: 26.9 ML/M2
LEFT VENTRICLE INTERNAL DIMENSION DIASTOLE: 5 CM (ref 3.5–6)
LEFT VENTRICULAR OUTFLOW TRACT DIAMETER: 2.4 CM
LV EJECTION FRACTION BIPLANE: 58 %
MAGNESIUM SERPL-MCNC: 2.28 MG/DL (ref 1.6–2.4)
MCH RBC QN AUTO: 33.6 PG (ref 26–34)
MCHC RBC AUTO-ENTMCNC: 35.2 G/DL (ref 32–36)
MCV RBC AUTO: 96 FL (ref 80–100)
MITRAL VALVE E/A RATIO: 0.81
NRBC BLD-RTO: 0 /100 WBCS (ref 0–0)
PHOSPHATE SERPL-MCNC: 3.3 MG/DL (ref 2.5–4.9)
PLATELET # BLD AUTO: 242 X10*3/UL (ref 150–450)
POTASSIUM SERPL-SCNC: 4 MMOL/L (ref 3.5–5.3)
RBC # BLD AUTO: 4.34 X10*6/UL (ref 4.5–5.9)
RIGHT VENTRICLE FREE WALL PEAK S': 15.9 CM/S
RIGHT VENTRICLE PEAK SYSTOLIC PRESSURE: 20 MMHG
SODIUM SERPL-SCNC: 130 MMOL/L (ref 136–145)
TRICUSPID ANNULAR PLANE SYSTOLIC EXCURSION: 3.4 CM
WBC # BLD AUTO: 4.5 X10*3/UL (ref 4.4–11.3)

## 2025-07-28 PROCEDURE — 85027 COMPLETE CBC AUTOMATED: CPT | Performed by: STUDENT IN AN ORGANIZED HEALTH CARE EDUCATION/TRAINING PROGRAM

## 2025-07-28 PROCEDURE — 83735 ASSAY OF MAGNESIUM: CPT | Performed by: STUDENT IN AN ORGANIZED HEALTH CARE EDUCATION/TRAINING PROGRAM

## 2025-07-28 PROCEDURE — 93306 TTE W/DOPPLER COMPLETE: CPT

## 2025-07-28 PROCEDURE — 93306 TTE W/DOPPLER COMPLETE: CPT | Performed by: INTERNAL MEDICINE

## 2025-07-28 PROCEDURE — 80048 BASIC METABOLIC PNL TOTAL CA: CPT | Performed by: STUDENT IN AN ORGANIZED HEALTH CARE EDUCATION/TRAINING PROGRAM

## 2025-07-28 PROCEDURE — 2500000001 HC RX 250 WO HCPCS SELF ADMINISTERED DRUGS (ALT 637 FOR MEDICARE OP): Performed by: NURSE PRACTITIONER

## 2025-07-28 PROCEDURE — RXMED WILLOW AMBULATORY MEDICATION CHARGE

## 2025-07-28 PROCEDURE — 99239 HOSP IP/OBS DSCHRG MGMT >30: CPT | Performed by: STUDENT IN AN ORGANIZED HEALTH CARE EDUCATION/TRAINING PROGRAM

## 2025-07-28 PROCEDURE — 70551 MRI BRAIN STEM W/O DYE: CPT

## 2025-07-28 PROCEDURE — 36415 COLL VENOUS BLD VENIPUNCTURE: CPT | Performed by: STUDENT IN AN ORGANIZED HEALTH CARE EDUCATION/TRAINING PROGRAM

## 2025-07-28 PROCEDURE — 70551 MRI BRAIN STEM W/O DYE: CPT | Performed by: RADIOLOGY

## 2025-07-28 PROCEDURE — 84100 ASSAY OF PHOSPHORUS: CPT | Performed by: STUDENT IN AN ORGANIZED HEALTH CARE EDUCATION/TRAINING PROGRAM

## 2025-07-28 PROCEDURE — 2500000001 HC RX 250 WO HCPCS SELF ADMINISTERED DRUGS (ALT 637 FOR MEDICARE OP): Performed by: STUDENT IN AN ORGANIZED HEALTH CARE EDUCATION/TRAINING PROGRAM

## 2025-07-28 PROCEDURE — 2500000004 HC RX 250 GENERAL PHARMACY W/ HCPCS (ALT 636 FOR OP/ED): Performed by: STUDENT IN AN ORGANIZED HEALTH CARE EDUCATION/TRAINING PROGRAM

## 2025-07-28 PROCEDURE — 99232 SBSQ HOSP IP/OBS MODERATE 35: CPT | Performed by: NURSE PRACTITIONER

## 2025-07-28 RX ORDER — CLOPIDOGREL BISULFATE 75 MG/1
75 TABLET ORAL DAILY
Qty: 20 TABLET | Refills: 0 | Status: SHIPPED | OUTPATIENT
Start: 2025-07-29 | End: 2025-08-18

## 2025-07-28 RX ORDER — CLOPIDOGREL BISULFATE 75 MG/1
75 TABLET ORAL DAILY
Status: DISCONTINUED | OUTPATIENT
Start: 2025-07-28 | End: 2025-07-28 | Stop reason: HOSPADM

## 2025-07-28 RX ADMIN — CLOPIDOGREL 75 MG: 75 TABLET ORAL at 14:42

## 2025-07-28 RX ADMIN — ENOXAPARIN SODIUM 40 MG: 100 INJECTION SUBCUTANEOUS at 14:43

## 2025-07-28 RX ADMIN — AMLODIPINE BESYLATE 10 MG: 10 TABLET ORAL at 08:16

## 2025-07-28 RX ADMIN — LISINOPRIL 40 MG: 40 TABLET ORAL at 12:07

## 2025-07-28 RX ADMIN — ASPIRIN 81 MG: 81 TABLET, COATED ORAL at 08:11

## 2025-07-28 SDOH — HEALTH STABILITY: PHYSICAL HEALTH
HOW OFTEN DO YOU NEED TO HAVE SOMEONE HELP YOU WHEN YOU READ INSTRUCTIONS, PAMPHLETS, OR OTHER WRITTEN MATERIAL FROM YOUR DOCTOR OR PHARMACY?: NEVER

## 2025-07-28 SDOH — SOCIAL STABILITY: SOCIAL INSECURITY: ARE YOU MARRIED, WIDOWED, DIVORCED, SEPARATED, NEVER MARRIED, OR LIVING WITH A PARTNER?: MARRIED

## 2025-07-28 ASSESSMENT — COGNITIVE AND FUNCTIONAL STATUS - GENERAL
MOBILITY SCORE: 24
DAILY ACTIVITIY SCORE: 24

## 2025-07-28 ASSESSMENT — PAIN - FUNCTIONAL ASSESSMENT: PAIN_FUNCTIONAL_ASSESSMENT: 0-10

## 2025-07-28 ASSESSMENT — PAIN SCALES - GENERAL: PAINLEVEL_OUTOF10: 0 - NO PAIN

## 2025-07-28 NOTE — CARE PLAN
The patient's goals for the shift include      The clinical goals for the shift include pt will not have changes with neuro assessment this shift    Over the shift, the patient did make progress toward the following goals.

## 2025-07-28 NOTE — PROGRESS NOTES
07/28/25 1208   Discharge Planning   Living Arrangements Spouse/significant other   Support Systems Spouse/significant other   Assistance Needed none   Type of Residence Private residence   Home or Post Acute Services None   Expected Discharge Disposition Home   Intensity of Service   Intensity of Service 0-30 min     Met with patient and his wife Yola at bedside. Patient lives at home with spouse. He is independent, no use of DME. PCP is Perry Samaniego. Patient confirms understanding of how to manage his health at home and of his home medications. He plans to return home when cleared for discharge.

## 2025-07-28 NOTE — NURSING NOTE
0800 Pt A&O x4, calm and cooperative. Negative neuro assessment. Pt denies difficulty with speech.   0816 pt taken off the floor to BIO for ECHO.   0942 Dr Castellanos at bedside. Pt denies stroke like symptoms at this time.   1550 Dr Galaviz at bedside.  1730 IV's and tele removed. Discharge instructions reviewed, pt and wife states understanding. Pt sts understanding to come back in the morning to have Ziopatch placed.

## 2025-07-28 NOTE — CARE PLAN
The patient's goals for the shift include sleep    The clinical goals for the shift include see care plan    Problem: General Stroke  Goal: Establish a mutual long term goal with patient by discharge  Outcome: Progressing  Goal: Demonstrate improvement in neurological exam throughout the shift  Outcome: Progressing  Goal: Maintain BP within ordered limits throughout shift  Outcome: Progressing  Goal: Participate in treatment (ie., meds, therapy) throughout shift  Outcome: Progressing  Goal: No symptoms of aspiration throughout shift  Outcome: Progressing  Goal: No symptoms of hemorrhage throughout shift  Outcome: Progressing  Goal: Tolerate enteral feeding throughout shift  Outcome: Progressing  Goal: Decreased nausea/vomiting throughout shift  Outcome: Progressing  Goal: Controlled blood glucose throughout shift  Outcome: Progressing  Goal: Out of bed three times today  Outcome: Progressing     Problem: ICU Stroke  Goal: Maintain ICP within ordered limits throughout shift  Outcome: Progressing  Goal: Tolerate EVD clamping trial throughout shift  Outcome: Progressing  Goal: Tolerate ventilator weaning trial during shift  Outcome: Progressing  Goal: Maintain patent airway throughout shift  Outcome: Progressing  Goal: Achieve/maintain targeted sodium level throughout shift  Outcome: Progressing     Problem: Pain - Adult  Goal: Verbalizes/displays adequate comfort level or baseline comfort level  Outcome: Progressing     Problem: Safety - Adult  Goal: Free from fall injury  Outcome: Progressing     Problem: Discharge Planning  Goal: Discharge to home or other facility with appropriate resources  Outcome: Progressing     Problem: Chronic Conditions and Co-morbidities  Goal: Patient's chronic conditions and co-morbidity symptoms are monitored and maintained or improved  Outcome: Progressing     Problem: Nutrition  Goal: Nutrient intake appropriate for maintaining nutritional needs  Outcome: Progressing

## 2025-07-28 NOTE — ASSESSMENT & PLAN NOTE
- MRI brain showed no evidence of acute ischemic stroke.  - Pt reports presenting symptoms (aphasia/visual aura) were different than his usual migraines that he has had for 30 years.  - No recurrence of presenting symptoms since admission.    Recommend:    Continue aspirin 81mg daily and atorvastatin 20mg daily  Add Plavix 75mg daily for 21 days.  Ziopatch at discharge.  Follow up with OCTAVIO Perales in 6 weeks.    Aggressive management of vascular risk factors for stroke prevention.   Vascular Risk Factor modification goals:  Blood pressure goals: avoid hypotension SBP <100 that could worsen cerebral perfusion, Ischemic stroke- early permissive hypertension SBP < 220 mmHg with cautious inpatient lowering  Lipid Goals: education on healthy diet and statin therapy to maintain or achieve goal LDL-cholesterol < 70mg  Glucose Goals: early treatment of hyperglycemia to goal glucose 140-180 mg/dl with long-term goal A1c < 7%   Assessment for Rehabilitation needs   Patient and family education on signs and symptoms of stroke, calling 911, healthy strategies for stroke prevention.

## 2025-07-28 NOTE — DISCHARGE INSTRUCTIONS
-Your medications have changed, please review your list carefully     - A Ziopatch (cardiac monitor) was ordered for you, please call and schedule return for placement    -Please follow up with OCTAVIO Perales in 6 weeks, please call to schedule   -Please follow up with your PCP in 7-10 days, please call to schedule     -Please have repeat labs on Friday 8/1, you were noted to have low sodium on your labs

## 2025-07-28 NOTE — DISCHARGE SUMMARY
Discharge Diagnosis  TIA (transient ischemic attack)           Issues Requiring Follow-Up  TIA   Holter Monitor   Hyponatremia    Discharge Meds     Medication List      START taking these medications     clopidogrel 75 mg tablet; Commonly known as: Plavix; Take 1 tablet (75   mg) by mouth once daily for 20 doses.; Start taking on: July 29, 2025     CHANGE how you take these medications     lisinopril 40 mg tablet; Take 1 tablet (40 mg) by mouth once daily.;   What changed: when to take this     CONTINUE taking these medications     amLODIPine 10 mg tablet; Commonly known as: Norvasc; TAKE ONE TABLET BY   MOUTH DAILY   aspirin 81 mg EC tablet   atorvastatin 20 mg tablet; Commonly known as: Lipitor; Take 1 tablet (20   mg) by mouth once daily at bedtime.   biotin 1 mg capsule   butalbital-acetaminophen-caff -40 mg tablet; Take 1 tablet by   mouth every 8 hours.   Centrum Silver Ultra Men's 216--300 mcg tablet; Generic drug:   mv-min-folic-K1-lycopen-lutein       Test Results Pending At Discharge  Pending Labs       No current pending labs.            Hospital Course    This is an 80-year-old male who presented with dysphasia. It was transient and resolved upon arrival. He was admitted for CVA r/o. Neuro was consulted. MRI showed No acute infarct, hemorrhage or mass is noted. The parenchymal hyperintensities may be secondary to chronic microvascular ischemic changes among others. He was felt to have had a TIA and medications were adjusted. Echo showed EF 55 to 60%, diastolic dysfunction, moderately dilated left atrium, right atrium moderately dilated is an 80-year-old male who presented with dysarthria. He was also noted to have acute on chronic hyponatremia which improved but did not resolve.  He was determined to be medically stable for discharge home with instrcutions for follow up and for holter montior placement.     D/c >30min    Pertinent Physical Exam At Time of Discharge  Physical  Exam  Constitutional: Well developed, no distress, alert and cooperative  Skin: Warm and dry  Eyes: EOMI, clear sclera  ENMT: mucous membranes moist  Respiratory: Patent airways, CTAB  Cardiovascular: Regular, rate and rhythm  Abdominal: Nondistended, soft, non-tender, +BS  MSK: ROM intact  Neuro: alert and oriented x3, CN II-XII intact, speech clear and fluent    Outpatient Follow-Up  Future Appointments   Date Time Provider Department Center   8/20/2025  8:40 AM Juancarlos Contreras MD [id number] [place]   8/20/2025 10:00 AM DAVID Moyer, CCC-A [id number] [place]   9/11/2025  8:30 AM Perry Samaniego DO [id number] [place]   3/19/2026  8:30 AM Perry Samaniego DO [id number] [place]         Yuki Castellanos

## 2025-07-28 NOTE — PROGRESS NOTES
"Jose Gamez is a 80 y.o. male on day 1 of admission presenting with TIA (transient ischemic attack).      Subjective   Evaluated today at bedside.  Wife, Yola, was present.  Pt's speech difficulty and visual aura had resolved by the time he arrived to ED.  He has had no recurrence of these symptoms since admission.       Objective     Last Recorded Vitals  Blood pressure 118/77, pulse 68, temperature 36.2 °C (97.2 °F), temperature source Temporal, resp. rate 16, height 1.753 m (5' 9\"), weight 79.4 kg (175 lb), SpO2 99%.    Physical Exam    Eyes:      Extraocular Movements: Extraocular movements intact.      Pupils: Pupils are equal, round, and reactive to light.       Neurological:      Motor: Motor strength is normal.    Psychiatric:         Speech: Speech normal.       Neurological Exam  Mental Status  Awake, alert and oriented to person, place and time. Speech is normal. Language is fluent with no aphasia.    Cranial Nerves  CN II: Visual fields full to confrontation.  CN III, IV, VI: Extraocular movements intact bilaterally. Pupils equal round and reactive to light bilaterally.  CN V: Facial sensation is normal.  CN VII: Full and symmetric facial movement.  CN VIII: Hearing is normal.  CN IX, X: Palate elevates symmetrically  CN XI: Shoulder shrug strength is normal.  CN XII: Tongue midline without atrophy or fasciculations.    Motor   Strength is 5/5 throughout all four extremities.    Sensory  Light touch is normal in upper and lower extremities.     Coordination  Right: Finger-to-nose normal.Left: Finger-to-nose normal.    Gait  Casual gait is normal including stance, stride, and arm swing.    Relevant Results      NIH Stroke Scale  1A. Level of Consciousness: Alert, Keenly Responsive  1B. Ask Month and Age: Both Questions Right  1C. Blink Eyes & Squeeze Hands: Performs Both Tasks  2. Best Gaze: Normal  3. Visual: No Visual Loss  4. Facial Palsy: Normal Symmetrical Movements  5A. Motor - Left Arm: " No Drift  5B. Motor - Right Arm: No Drift  6A. Motor - Left Leg: No Drift  6B. Motor - Right Leg: No Drift  7. Limb Ataxia: Absent  8. Sensory Loss: Normal  9. Best Language: No Aphasia  10. Dysarthria: Normal  11. Extinction and Inattention: No Abnormality  NIH Stroke Scale: 0           Laurel Coma Scale  Best Eye Response: Spontaneous  Best Verbal Response: Oriented  Best Motor Response: Follows commands  [place] Coma Scale Score: 15        Scheduled medications  Scheduled Medications[1]  Continuous medications  Continuous Medications[2]  PRN medications  PRN Medications[3]  Results for orders placed or performed during the hospital encounter of 07/27/25 (from the past 24 hours)   Basic Metabolic Panel   Result Value Ref Range    Glucose 132 (H) 74 - 99 mg/dL    Sodium 130 (L) 136 - 145 mmol/L    Potassium 3.9 3.5 - 5.3 mmol/L    Chloride 97 (L) 98 - 107 mmol/L    Bicarbonate 25 21 - 32 mmol/L    Anion Gap 12 10 - 20 mmol/L    Urea Nitrogen 12 6 - 23 mg/dL    Creatinine 0.83 0.50 - 1.30 mg/dL    eGFR 88 >60 mL/min/1.73m*2    Calcium 9.1 8.6 - 10.3 mg/dL   Transthoracic Echo Complete   Result Value Ref Range    AV pk yandy 1.31 m/s    LV Biplane EF 58 %    LVOT diam 2.40 cm    MV E/A ratio 0.81     Tricuspid annular plane systolic excursion 3.4 cm    LA vol index A/L 26.9 ml/m2    AV mn grad 4 mmHg    LV EF 58 %    RV free wall pk S' 15.90 cm/s    RVSP 20 mmHg    LVIDd 5.00 cm    Aortic Valve Area by Continuity of Peak Velocity 2.86 cm2    AV pk grad 7 mmHg    Aortic Valve Area by Continuity of VTI 2.67 cm2    LV A4C EF 59.2    CBC   Result Value Ref Range    WBC 4.5 4.4 - 11.3 x10*3/uL    nRBC 0.0 0.0 - 0.0 /100 WBCs    RBC 4.34 (L) 4.50 - 5.90 x10*6/uL    Hemoglobin 14.6 13.5 - 17.5 g/dL    Hematocrit 41.5 41.0 - 52.0 %    MCV 96 80 - 100 fL    MCH 33.6 26.0 - 34.0 pg    MCHC 35.2 32.0 - 36.0 g/dL    RDW 11.6 11.5 - 14.5 %    Platelets 242 150 - 450 x10*3/uL   Basic Metabolic Panel   Result Value Ref Range     Glucose 138 (H) 74 - 99 mg/dL    Sodium 130 (L) 136 - 145 mmol/L    Potassium 4.0 3.5 - 5.3 mmol/L    Chloride 99 98 - 107 mmol/L    Bicarbonate 24 21 - 32 mmol/L    Anion Gap 11 10 - 20 mmol/L    Urea Nitrogen 10 6 - 23 mg/dL    Creatinine 0.76 0.50 - 1.30 mg/dL    eGFR 90 >60 mL/min/1.73m*2    Calcium 9.1 8.6 - 10.3 mg/dL   Magnesium   Result Value Ref Range    Magnesium 2.28 1.60 - 2.40 mg/dL   Phosphorus   Result Value Ref Range    Phosphorus 3.3 2.5 - 4.9 mg/dL     Transthoracic Echo Complete  Result Date: 7/28/2025            [facility] [place] [address] [address] [id number]    Tel [phone] Fax [phone] TRANSTHORACIC ECHOCARDIOGRAM REPORT Patient Name:       BUZZ Mendez Physician:    [id number] Timothy Shaffer MD Study Date:         7/28/2025            Ordering Provider:    [id number] SOFIE FERNANDEZ MRN/PID:            [id number]             Fellow: Accession#:         [id number]         Nurse: Date of Birth/Age:  1945 / 80 years Sonographer:          Anu Allen Gender Assigned at  M                    Additional Staff: Birth: Height:             175.26 cm            Admit Date: Weight:             79.38 kg             Admission Status:     Inpatient -                                                                Priority                                                                discharge BSA / BMI:          1.95 m2 / 25.84      Department Location:  [address]                     kg/m2 Blood Pressure: 117 /65 mmHg Study Type:    TRANSTHORACIC ECHO (TTE) COMPLETE Diagnosis/ICD: Transient cerebral ischemic attack, unspecified (NOT on                LCD)-G45.9 Indication:    TIA/stroke workup CPT Codes:     Echo Complete w Full [id number] Patient History: Pertinent History: HTN. Ascending Aorta dilation. Study Detail: The following Echo studies were performed: 2D, Doppler, M-Mode and               color  flow. Agitated saline used as a contrast agent for               intraseptal flow evaluation.  PHYSICIAN INTERPRETATION: Left Ventricle: Left ventricular ejection fraction is normal by visual estimate at 55-60%. There are no regional wall motion abnormalities. The left ventricular cavity size is normal. There is no evidence of left ventricular hypertrophy. Spectral Doppler shows a Grade I (impaired relaxation pattern) of left ventricular diastolic filling with normal left atrial filling pressure. Left Atrium: The left atrial size is moderately dilated. A bubble study using agitated saline was performed. Bubble study is negative. Bubble study of marginal quality. Right Ventricle: The right ventricle is normal in size. There is normal right ventricular global systolic function. Right Atrium: The right atrial size is moderately dilated. Aortic Valve: The aortic valve is trileaflet. The aortic valve area by VTI is 2.67 cmï¿½ with a peak velocity of 1.31 m/s. The peak and mean gradients are 7 mmHg and 4 mmHg, respectively, with a dimensionless index of 0.59. There is no evidence of aortic valve regurgitation. Mitral Valve: The mitral valve is normal in structure. There is no evidence of mitral valve regurgitation. The E Vmax is 0.69 m/s. Tricuspid Valve: The tricuspid valve is structurally normal. No evidence of tricuspid regurgitation. The Doppler estimated right ventricular systolic pressure (RVSP) is within normal limits at 20 mmHg. Pulmonic Valve: The pulmonic valve is structurally normal. There is no indication of pulmonic valve regurgitation. Pericardium: No pericardial effusion noted. Aorta: The aortic root is normal.  CONCLUSIONS:  1. Left ventricular ejection fraction is normal by visual estimate at 55-60%.  2. Spectral Doppler shows a Grade I (impaired relaxation pattern) of left ventricular diastolic filling with normal left atrial filling pressure.  3. There is no evidence of left ventricular hypertrophy.  4.  There is normal right ventricular global systolic function.  5. The left atrial size is moderately dilated.  6. Bubble study of marginal quality.  7. The right atrial size is moderately dilated.  8. The Doppler estimated RVSP is within normal limits at 20 mmHg. QUANTITATIVE DATA SUMMARY:  2D MEASUREMENTS:             Normal Ranges: LAs:             3.60 cm     (2.7-4.0cm) IVSd:            0.90 cm     (0.6-1.1cm) LVPWd:           1.10 cm     (0.6-1.1cm) LVIDd:           5.00 cm     (3.9-5.9cm) LVIDs:           3.60 cm LV Mass Index:   93.2 g/m2 LVEDV Index:     48.93 ml/m2 LV % FS          28.0 %  LEFT ATRIUM:                  Normal Ranges: LA Vol A4C:        40.7 ml    (22+/-6mL/m2) LA Vol A2C:        62.4 ml LA Vol BP:         52.5 ml LA Vol Index A4C:  20.9ml/m2 LA Vol Index A2C:  32.0 ml/m2 LA Vol Index BP:   26.9 ml/m2 LA Area A4C:       15.6 cm2 LA Area A2C:       20.1 cm2 LA Major Axis A4C: 5.1 cm LA Major Axis A2C: 5.5 cm LA Volume Index:   24.9 ml/m2 LA Vol A4C:        36.7 ml LA Vol A2C:        60.3 ml LA Vol Index BSA:  24.9 ml/m2  RIGHT ATRIUM:                 Normal Ranges: RA Vol A4C:        44.8 ml    (8.3-19.5ml) RA Vol Index A4C:  22.9 ml/m2 RA Area A4C:       17.3 cm2 RA Major Axis A4C: 5.7 cm  M-MODE MEASUREMENTS:         Normal Ranges: Ao Root:             3.60 cm (2.0-3.7cm) AoV Exc:             2.00 cm (1.5-2.5cm)  AORTA MEASUREMENTS:         Normal Ranges: AoV Exc:            2.00 cm (1.5-2.5cm) Ao Sinus, d:        3.90 cm (2.1-3.5cm) Ao STJ, d:          3.20 cm (1.7-3.4cm) Asc Ao, d:          4.20 cm (2.1-3.4cm)  LV SYSTOLIC FUNCTION:                      Normal Ranges: EF-A4C View:    59 % (>=55%) EF-A2C View:    54 % EF-Biplane:     58 % EF-Visual:      58 % LV EF Reported: 58 %  LV DIASTOLIC FUNCTION:            Normal Ranges: MV Peak E:             0.69 m/s   (0.7-1.2 m/s) MV Peak A:             0.85 m/s   (0.42-0.7 m/s) E/A Ratio:             0.81       (1.0-2.2) MV e'                   0.065 m/s  (>8.0) MV lateral e'          0.07 m/s MV medial e'           0.06 m/s E/e' Ratio:            10.72      (<8.0) PulmV Sys Khai:         66.20 cm/s PulmV Cooper Khai:        37.60 cm/s PulmV S/D Khai:         1.80 PulmV A Revs Khai:      28.10 cm/s PulmV A Revs Dur:      95.00 msec  MITRAL VALVE:          Normal Ranges: MV DT:        147 msec (150-240msec)  MITRAL INSUFFICIENCY:             Normal Ranges: MR Vmax:              389.00 cm/s  AORTIC VALVE:                     Normal Ranges: AoV Vmax:                1.31 m/s (<=1.7m/s) AoV Peak P.9 mmHg (<20mmHg) AoV Mean P.0 mmHg (1.7-11.5mmHg) LVOT Max Khai:            0.83 m/s (<=1.1m/s) AoV VTI:                 33.40 cm (18-25cm) LVOT VTI:                19.70 cm LVOT Diameter:           2.40 cm  (1.8-2.4cm) AoV Area, VTI:           2.67 cm2 (2.5-5.5cm2) AoV Area,Vmax:           2.86 cm2 (2.5-4.5cm2) AoV Dimensionless Index: 0.59  AORTIC INSUFFICIENCY: AI Vmax:       3.42 m/s AI Half-time:  599 msec AI Decel Rate: 167.00 cm/s2  RIGHT VENTRICLE: RV Basal 4.30 cm RV Mid   3.40 cm RV Major 7.3 cm TAPSE:   33.9 mm RV s'    0.16 m/s  TRICUSPID VALVE/RVSP:          Normal Ranges: Peak TR Velocity:     2.05 m/s Est. RA Pressure:     3 mmHg RV Syst Pressure:     20 mmHg  (< 30mmHg) IVC Diam:             1.50 cm  PULMONIC VALVE:          Normal Ranges: PV Accel Time:  127 msec (>120ms) PV Max Khai:     1.0 m/s  (0.6-0.9m/s) PV Max P.1 mmHg  PULMONARY VEINS: PulmV A Revs Dur: 95.00 msec PulmV A Revs Khai: 28.10 cm/s PulmV Cooper Khai:   37.60 cm/s PulmV S/D Khai:    1.80 PulmV Sys Khai:    66.20 cm/s  [id number] Timothy Shaffer MD Electronically signed on 2025 at 4:17:01 PM  ** Final **     MR brain wo IV contrast  Result Date: 2025  Interpreted By:  Nilesh Cheng, STUDY: MR BRAIN WO IV CONTRAST;  2025 9:24 am   INDICATION: Signs/Symptoms:tia/stroke workup.     COMPARISON: 2025 brain CT   ACCESSION NUMBER(S):  [id number]   ORDERING CLINICIAN: SOFIE FERNANDEZ   TECHNIQUE: Axial T2, FLAIR, DWI, gradient echo T2 and sagittal and coronal T1 weighted images of brain were acquired.   FINDINGS: CSF Spaces: The ventricles are mildly enlarged relative to the sulci in a nonspecific pattern, unchanged.   Parenchyma: No acute infarct, hemorrhage or mass is noted.   The white matter and lennie have focal nonspecific FLAIR hyperintensities.   Paranasal Sinuses and Mastoids: The left maxillary sinus has a small amount of layering mucus. The mastoid air cells have trace amounts of fluid bilaterally.   Lens resections have been performed bilaterally.       No acute infarct, hemorrhage or mass is noted.   The parenchymal hyperintensities may be secondary to chronic microvascular ischemic changes among others.   MACRO: None   Signed by: Nilesh Cheng 7/28/2025 10:23 AM Dictation workstation:   [id number]    ECG 12 lead  Result Date: 7/27/2025  Normal sinus rhythm Left axis deviation Left ventricular hypertrophy with QRS widening and repolarization abnormality ( R in aVL , [place] product ) Abnormal ECG When compared with ECG of 19-FEB-2025 08:19, No significant change was found    CT brain attack angio head and neck W and WO IV contrast  Result Date: 7/27/2025  Interpreted By:  Jessica Stokes, STUDY: CT BRAIN ATTACK ANGIO HEAD AND NECK W AND WO IV CONTRAST;  7/27/2025 11:42 am   INDICATION: Signs/Symptoms:brain attack.     COMPARISON: None.   ACCESSION NUMBER(S): [id number]   ORDERING CLINICIAN: DERRELL LEONARDO   TECHNIQUE: Unenhanced CT images of the head were obtained. Subsequently, 70 ML of Omnipaque 350 was administered intravenously and axial images of the head and neck were acquired.  Coronal, sagittal, and 3-D reconstructions were provided for review.   FINDINGS:     CTA HEAD FINDINGS:   Anterior circulation: Mild atherosclerotic calcifications of bilateral carotid siphons without stenosis. Otherwise the bilateral intracranial internal  carotid arteries, bilateral carotid terminals, bilateral proximal anterior and middle cerebral arteries are normal. Mild paucity of right MCA cortical branches as compared to contralateral side concerning for diminished flow.   Posterior circulation: Bilateral intracranial vertebral arteries, vertebrobasilar junction, basilar artery and proximal posterior cerebral arteries are normal.   CTA NECK FINDINGS: The aortic arch demonstrates bovine type branching pattern   Right carotid vessels: The common carotid artery is normal. The carotid bifurcation. Shows mild atherosclerotic calcifications.. The internal carotid artery in the neck is normal. There is 0% stenosis  .   Left carotid vessels: The common carotid artery is normal. The carotid bifurcation is normal. The internal carotid artery in the neck is normal. There is 0% stenosis  .   Vertebral vessels:  The visualized segments of the cervical vertebral arteries are normal in caliber.   Visualized lungs are clear.   Focal mucosal thickening within scattered ethmoidal air cells and left maxillary sinus.       Mild paucity of right MCA cortical branches as compared to left side. This may reflect an anatomical variant versus diminished flow within right MCA territory. Clinical correlation is recommended.   Otherwise, no evidence of major vessel cut off or hemodynamically significant stenosis on CT angiogram of head and neck.   MACRO: Jessica Stokes discussed the significance and urgency of this critical finding by EPIC secure chat with  Gian Mckay on 7/27/2025 at 11:52 am.  (**-RCF-**) Findings:  See findings.   Signed by: Jessica Stokes 7/27/2025 11:52 AM Dictation workstation:   [id number]    CT brain attack head wo IV contrast  Result Date: 7/27/2025  Interpreted By:  Nazario Bardales, STUDY: CT BRAIN ATTACK HEAD WO IV CONTRAST;  7/27/2025 11:30 am   INDICATION: Signs/Symptoms:Stroke Evaluation.   COMPARISON: None.   ACCESSION NUMBER(S): [id number]   ORDERING  CLINICIAN: DERRELL LEONARDO   TECHNIQUE: Sequential trans axial images were obtained  .   FINDINGS: INTRACRANIAL:   There is moderate prominence of the cortical sulci indicating atrophy.   There is moderate ventriculomegaly, again consistent with atrophy.   Mild decreased attenuation of the periventricular and long tracks of the white matter most consistent with gliosis from arterial disease. There is no evidence of definite subacute infarction, intracranial hemorrhage or mass.     EXTRACRANIAL: Visualized paranasal sinuses and mastoids are clear. The calvarium is intact.       Age related degenerative change as described without acute findings.   MACRO: Nazario Bardales discussed the significance and urgency of this critical finding by EPIC secure chat with  DERRELL LEONARDO on 7/27/2025 at 11:42 am.  (**-RCF-**) Findings:  See findings.   Signed by: Nazario Bardales 7/27/2025 11:42 AM Dictation workstation:   [id number]                Assessment & Plan  TIA (transient ischemic attack)  - MRI brain showed no evidence of acute ischemic stroke.  - Pt reports presenting symptoms (aphasia/visual aura) were different than his usual migraines that he has had for 30 years.  - No recurrence of presenting symptoms since admission.    Recommend:    Continue aspirin 81mg daily and atorvastatin 20mg daily  Add Plavix 75mg daily for 21 days.  Ziopatch at discharge.  Follow up with OCTAVIO Perales in 6 weeks.    Aggressive management of vascular risk factors for stroke prevention.   Vascular Risk Factor modification goals:  Blood pressure goals: avoid hypotension SBP <100 that could worsen cerebral perfusion, Ischemic stroke- early permissive hypertension SBP < 220 mmHg with cautious inpatient lowering  Lipid Goals: education on healthy diet and statin therapy to maintain or achieve goal LDL-cholesterol < 70mg  Glucose Goals: early treatment of hyperglycemia to goal glucose 140-180 mg/dl with long-term goal A1c < 7%    Assessment for Rehabilitation needs   Patient and family education on signs and symptoms of stroke, calling 911, healthy strategies for stroke prevention.      Hypercholesteremia    Hypertension    Migraine aura without headache    Hyponatremia      Case/plan discussed and pt seen with Dr. Galaviz.     I spent 45 minutes in the professional and overall care of this patient.      Halle Hernandez, APRN-CNP       [1] amLODIPine, 10 mg, oral, Daily  aspirin, 81 mg, oral, Daily  atorvastatin, 20 mg, oral, Nightly  clopidogrel, 75 mg, oral, Daily  enoxaparin, 40 mg, subcutaneous, q24h  lisinopril, 40 mg, oral, Daily with lunch  perflutren lipid microspheres, 0.5-10 mL of dilution, intravenous, Once in imaging  perflutren protein A microsphere, 0.5 mL, intravenous, Once in imaging  sulfur hexafluoride microsphr, 2 mL, intravenous, Once in imaging  [2]    [3] PRN medications: hydrALAZINE, labetaloL

## 2025-07-30 ENCOUNTER — PATIENT OUTREACH (OUTPATIENT)
Dept: PRIMARY CARE | Facility: CLINIC | Age: 80
End: 2025-07-30
Payer: MEDICARE

## 2025-07-30 ENCOUNTER — HOSPITAL ENCOUNTER (OUTPATIENT)
Dept: CARDIOLOGY | Facility: HOSPITAL | Age: 80
Discharge: HOME | End: 2025-07-30
Payer: MEDICARE

## 2025-07-30 DIAGNOSIS — G45.9 TIA (TRANSIENT ISCHEMIC ATTACK): ICD-10-CM

## 2025-07-30 PROCEDURE — 93246 EXT ECG>7D<15D RECORDING: CPT

## 2025-07-30 NOTE — PROGRESS NOTES
Discharge Facility:[facility]  Discharge Diagnosis:TIA  Admission Date:7/27/2025  Discharge Date: 7/28/2025    PCP Appointment Date:9/11/2025  Specialist Appointment Date:   7/30/2025 Cardio (holter placed)  8/20/2025 Neurology/David  8/20/2025 Cardio/[facility] Encounter and Summary Linked: Yes  ED to Hosp-Admission (Discharged) with Yuki Castellanos DO; Gian Mckay DO (07/27/2025)   See discharge assessment below for further details  Wrap Up  Wrap Up Additional Comments: -- (Jose is doing well, he got holter placed today and will fu with Cardiology. He will contact provider if any concerns) (7/30/2025  2:36 PM)    Engagement  Call Start Time: 1432 (7/30/2025  2:36 PM)    Medications  Medications reviewed with patient/caregiver?: Yes (7/30/2025  2:36 PM)  Is the patient having any side effects they believe may be caused by any medication additions or changes?: No (7/30/2025  2:36 PM)  Does the patient have all medications ordered at discharge?: Yes (7/30/2025  2:36 PM)  Care Management Interventions: No intervention needed (7/30/2025  2:36 PM)  Prescription Comments: -- (Plavix 75 mg qd x 20 doses) (7/30/2025  2:36 PM)  Is the patient taking all medications as directed (includes completed medication regime)?: Yes (7/30/2025  2:36 PM)  Medication Comments: -- (Jose verbalized understanding of medication) (7/30/2025  2:36 PM)    Appointments  Does the patient have a primary care provider?: Yes (7/30/2025  2:36 PM)  Care Management Interventions: Verified appointment date/time/provider (9/11/2025, states spoke w/PCP who feels scheduled appointment will be ok since seeing specialists.) (7/30/2025  2:36 PM)  Has the patient kept scheduled appointments due by today?: Yes (7/30/2025  2:36 PM)    Self Management  What is the home health agency?: -- (na) (7/30/2025  2:36 PM)  What Durable Medical Equipment (DME) was ordered?: -- (Holter Monitor, placed today at cardiology, who will fu) (7/30/2025  2:36  PM)    Patient Teaching  Does the patient have access to their discharge instructions?: Yes (7/30/2025  2:36 PM)  Care Management Interventions: Reviewed instructions with patient (7/30/2025  2:36 PM)  What is the patient's perception of their health status since discharge?: Improving (7/30/2025  2:36 PM)  Is the patient/caregiver able to teach back the hierarchy of who to call/visit for symptoms/problems? PCP, Specialist, Home Health nurse, Urgent Care, ED, 911: Yes (7/30/2025  2:36 PM)

## 2025-08-01 DIAGNOSIS — E87.1 HYPONATREMIA: ICD-10-CM

## 2025-08-01 LAB
ATRIAL RATE: 75 BPM
P AXIS: 56 DEGREES
P OFFSET: 167 MS
P ONSET: 115 MS
PR INTERVAL: 188 MS
Q ONSET: 209 MS
QRS COUNT: 12 BEATS
QRS DURATION: 124 MS
QT INTERVAL: 382 MS
QTC CALCULATION(BAZETT): 426 MS
QTC FREDERICIA: 411 MS
R AXIS: -45 DEGREES
T AXIS: 68 DEGREES
T OFFSET: 400 MS
VENTRICULAR RATE: 75 BPM

## 2025-08-13 ENCOUNTER — PATIENT OUTREACH (OUTPATIENT)
Dept: PRIMARY CARE | Facility: CLINIC | Age: 80
End: 2025-08-13
Payer: MEDICARE

## 2025-08-20 ENCOUNTER — OFFICE VISIT (OUTPATIENT)
Dept: CARDIOLOGY | Facility: CLINIC | Age: 80
End: 2025-08-20
Payer: MEDICARE

## 2025-08-20 ENCOUNTER — APPOINTMENT (OUTPATIENT)
Dept: NEUROLOGY | Facility: CLINIC | Age: 80
End: 2025-08-20
Payer: MEDICARE

## 2025-08-20 ENCOUNTER — CLINICAL SUPPORT (OUTPATIENT)
Dept: AUDIOLOGY | Facility: CLINIC | Age: 80
End: 2025-08-20
Payer: MEDICARE

## 2025-08-20 VITALS
DIASTOLIC BLOOD PRESSURE: 58 MMHG | HEIGHT: 69 IN | SYSTOLIC BLOOD PRESSURE: 110 MMHG | HEART RATE: 65 BPM | TEMPERATURE: 97.4 F | WEIGHT: 170.4 LBS | BODY MASS INDEX: 25.24 KG/M2

## 2025-08-20 VITALS
DIASTOLIC BLOOD PRESSURE: 62 MMHG | WEIGHT: 169.9 LBS | BODY MASS INDEX: 25.17 KG/M2 | OXYGEN SATURATION: 97 % | SYSTOLIC BLOOD PRESSURE: 109 MMHG | HEART RATE: 63 BPM | HEIGHT: 69 IN

## 2025-08-20 DIAGNOSIS — R93.1 AGATSTON CAC SCORE, >400: ICD-10-CM

## 2025-08-20 DIAGNOSIS — I77.810 MILD ASCENDING AORTA DILATION: ICD-10-CM

## 2025-08-20 DIAGNOSIS — H90.3 ASYMMETRIC SNHL (SENSORINEURAL HEARING LOSS): Primary | ICD-10-CM

## 2025-08-20 DIAGNOSIS — E78.00 HYPERCHOLESTEREMIA: ICD-10-CM

## 2025-08-20 DIAGNOSIS — G45.9 TIA (TRANSIENT ISCHEMIC ATTACK): Primary | ICD-10-CM

## 2025-08-20 DIAGNOSIS — H93.13 TINNITUS, BILATERAL: ICD-10-CM

## 2025-08-20 DIAGNOSIS — H90.3 SENSORINEURAL HEARING LOSS (SNHL) OF BOTH EARS: ICD-10-CM

## 2025-08-20 DIAGNOSIS — I10 PRIMARY HYPERTENSION: ICD-10-CM

## 2025-08-20 DIAGNOSIS — I25.10 ARTERIOSCLEROSIS OF CORONARY ARTERY: Primary | ICD-10-CM

## 2025-08-20 PROCEDURE — 1126F AMNT PAIN NOTED NONE PRSNT: CPT | Performed by: INTERNAL MEDICINE

## 2025-08-20 PROCEDURE — 3078F DIAST BP <80 MM HG: CPT | Performed by: INTERNAL MEDICINE

## 2025-08-20 PROCEDURE — 99214 OFFICE O/P EST MOD 30 MIN: CPT | Performed by: INTERNAL MEDICINE

## 2025-08-20 PROCEDURE — 1160F RVW MEDS BY RX/DR IN RCRD: CPT | Performed by: INTERNAL MEDICINE

## 2025-08-20 PROCEDURE — 3078F DIAST BP <80 MM HG: CPT | Performed by: NURSE PRACTITIONER

## 2025-08-20 PROCEDURE — 92557 COMPREHENSIVE HEARING TEST: CPT | Performed by: AUDIOLOGIST

## 2025-08-20 PROCEDURE — 99212 OFFICE O/P EST SF 10 MIN: CPT

## 2025-08-20 PROCEDURE — G2211 COMPLEX E/M VISIT ADD ON: HCPCS | Performed by: INTERNAL MEDICINE

## 2025-08-20 PROCEDURE — 1111F DSCHRG MED/CURRENT MED MERGE: CPT | Performed by: INTERNAL MEDICINE

## 2025-08-20 PROCEDURE — 3074F SYST BP LT 130 MM HG: CPT | Performed by: INTERNAL MEDICINE

## 2025-08-20 PROCEDURE — 99212 OFFICE O/P EST SF 10 MIN: CPT | Performed by: NURSE PRACTITIONER

## 2025-08-20 PROCEDURE — 92626 EVAL AUD FUNCJ 1ST HOUR: CPT | Mod: 59 | Performed by: AUDIOLOGIST

## 2025-08-20 PROCEDURE — 1111F DSCHRG MED/CURRENT MED MERGE: CPT | Performed by: NURSE PRACTITIONER

## 2025-08-20 PROCEDURE — 1159F MED LIST DOCD IN RCRD: CPT | Performed by: INTERNAL MEDICINE

## 2025-08-20 PROCEDURE — G2211 COMPLEX E/M VISIT ADD ON: HCPCS | Performed by: NURSE PRACTITIONER

## 2025-08-20 PROCEDURE — 1159F MED LIST DOCD IN RCRD: CPT | Performed by: NURSE PRACTITIONER

## 2025-08-20 PROCEDURE — 3074F SYST BP LT 130 MM HG: CPT | Performed by: NURSE PRACTITIONER

## 2025-08-20 ASSESSMENT — PATIENT HEALTH QUESTIONNAIRE - PHQ9
2. FEELING DOWN, DEPRESSED OR HOPELESS: NOT AT ALL
SUM OF ALL RESPONSES TO PHQ9 QUESTIONS 1 & 2: 0
2. FEELING DOWN, DEPRESSED OR HOPELESS: NOT AT ALL
SUM OF ALL RESPONSES TO PHQ9 QUESTIONS 1 AND 2: 0
1. LITTLE INTEREST OR PLEASURE IN DOING THINGS: NOT AT ALL
1. LITTLE INTEREST OR PLEASURE IN DOING THINGS: NOT AT ALL

## 2025-08-20 ASSESSMENT — ENCOUNTER SYMPTOMS
DEPRESSION: 0
OCCASIONAL FEELINGS OF UNSTEADINESS: 0
LOSS OF SENSATION IN FEET: 0

## 2025-08-20 ASSESSMENT — COLUMBIA-SUICIDE SEVERITY RATING SCALE - C-SSRS
2. HAVE YOU ACTUALLY HAD ANY THOUGHTS OF KILLING YOURSELF?: NO
1. IN THE PAST MONTH, HAVE YOU WISHED YOU WERE DEAD OR WISHED YOU COULD GO TO SLEEP AND NOT WAKE UP?: NO
6. HAVE YOU EVER DONE ANYTHING, STARTED TO DO ANYTHING, OR PREPARED TO DO ANYTHING TO END YOUR LIFE?: NO

## 2025-08-20 ASSESSMENT — LIFESTYLE VARIABLES
AUDIT-C TOTAL SCORE: 4
HOW OFTEN DO YOU HAVE A DRINK CONTAINING ALCOHOL: 4 OR MORE TIMES A WEEK
HOW MANY STANDARD DRINKS CONTAINING ALCOHOL DO YOU HAVE ON A TYPICAL DAY: 1 OR 2
SKIP TO QUESTIONS 9-10: 1
HOW OFTEN DO YOU HAVE SIX OR MORE DRINKS ON ONE OCCASION: NEVER

## 2025-08-20 ASSESSMENT — PAIN SCALES - GENERAL: PAINLEVEL_OUTOF10: 0-NO PAIN

## 2025-08-21 ENCOUNTER — PATIENT MESSAGE (OUTPATIENT)
Dept: CARDIOLOGY | Facility: CLINIC | Age: 80
End: 2025-08-21
Payer: MEDICARE

## 2025-08-21 DIAGNOSIS — I10 PRIMARY HYPERTENSION: ICD-10-CM

## 2025-08-21 RX ORDER — LISINOPRIL 40 MG/1
40 TABLET ORAL DAILY
Qty: 90 TABLET | Refills: 3 | Status: SHIPPED | OUTPATIENT
Start: 2025-08-21

## 2025-09-04 ENCOUNTER — CLINICAL SUPPORT (OUTPATIENT)
Dept: AUDIOLOGY | Facility: CLINIC | Age: 80
End: 2025-09-04
Payer: MEDICARE

## 2025-09-04 DIAGNOSIS — H90.3 ASYMMETRIC SNHL (SENSORINEURAL HEARING LOSS): Primary | ICD-10-CM

## 2025-09-04 PROCEDURE — HRANC PR HEARING AID NO CHARGE: Performed by: AUDIOLOGIST

## 2025-09-11 ENCOUNTER — APPOINTMENT (OUTPATIENT)
Dept: PRIMARY CARE | Facility: CLINIC | Age: 80
End: 2025-09-11
Payer: MEDICARE

## 2026-03-19 ENCOUNTER — APPOINTMENT (OUTPATIENT)
Dept: PRIMARY CARE | Facility: CLINIC | Age: 81
End: 2026-03-19
Payer: MEDICARE